# Patient Record
Sex: FEMALE | Race: BLACK OR AFRICAN AMERICAN | NOT HISPANIC OR LATINO
[De-identification: names, ages, dates, MRNs, and addresses within clinical notes are randomized per-mention and may not be internally consistent; named-entity substitution may affect disease eponyms.]

---

## 2020-11-10 ENCOUNTER — APPOINTMENT (OUTPATIENT)
Dept: OBGYN | Facility: CLINIC | Age: 35
End: 2020-11-10
Payer: COMMERCIAL

## 2020-11-10 VITALS
TEMPERATURE: 98.3 F | BODY MASS INDEX: 28.7 KG/M2 | SYSTOLIC BLOOD PRESSURE: 126 MMHG | HEIGHT: 63 IN | WEIGHT: 162 LBS | HEART RATE: 75 BPM | DIASTOLIC BLOOD PRESSURE: 83 MMHG

## 2020-11-10 DIAGNOSIS — Z82.49 FAMILY HISTORY OF ISCHEMIC HEART DISEASE AND OTHER DISEASES OF THE CIRCULATORY SYSTEM: ICD-10-CM

## 2020-11-10 DIAGNOSIS — Z01.419 ENCOUNTER FOR GYNECOLOGICAL EXAMINATION (GENERAL) (ROUTINE) W/OUT ABNORMAL FINDINGS: ICD-10-CM

## 2020-11-10 DIAGNOSIS — Z83.3 FAMILY HISTORY OF DIABETES MELLITUS: ICD-10-CM

## 2020-11-10 PROCEDURE — 99072 ADDL SUPL MATRL&STAF TM PHE: CPT

## 2020-11-10 PROCEDURE — 99213 OFFICE O/P EST LOW 20 MIN: CPT | Mod: 25

## 2020-11-10 PROCEDURE — 99395 PREV VISIT EST AGE 18-39: CPT

## 2020-11-10 NOTE — DISCUSSION/SUMMARY
[FreeTextEntry1] : 36 yo G0 here for annual exam and to schedule surgery for uterine myomas.\par Pt interested in conservative surgical intervention.\par Pt is a Pediatrician.\par \par Plan\par f/u Pap\par MRI\par RTO x 2 wks for results and preop chat.\par Schedule robotic myomectomy - pt understands that we will only address relevant fibroids and that not all fibroids will be removed.\par

## 2020-11-10 NOTE — PHYSICAL EXAM
[Appropriately responsive] : appropriately responsive [Alert] : alert [No Acute Distress] : no acute distress [No Lymphadenopathy] : no lymphadenopathy [Regular Rate Rhythm] : regular rate rhythm [No Murmurs] : no murmurs [Clear to Auscultation B/L] : clear to auscultation bilaterally [Soft] : soft [Non-tender] : non-tender [Non-distended] : non-distended [No HSM] : No HSM [No Lesions] : no lesions [No Mass] : no mass [Oriented x3] : oriented x3 [Examination Of The Breasts] : a normal appearance [No Masses] : no breast masses were palpable [Normal] : normal external genitalia

## 2020-11-10 NOTE — HISTORY OF PRESENT ILLNESS
[FreeTextEntry1] : 36 yo G0 here for annual and eval of ut myomas.\par Pt diagnosed w/fibroids in June.\par Pt noticed fibroids because she noticed constipation then noticed abd mass.\par \par Now s/p USG.\par No urinary frequency.\par Does have significant dysmenorrhea on day 1.\par

## 2020-11-27 ENCOUNTER — NON-APPOINTMENT (OUTPATIENT)
Age: 35
End: 2020-11-27

## 2020-11-27 LAB
CYTOLOGY CVX/VAG DOC THIN PREP: NORMAL
HPV HIGH+LOW RISK DNA PNL CVX: DETECTED

## 2020-12-02 ENCOUNTER — NON-APPOINTMENT (OUTPATIENT)
Age: 35
End: 2020-12-02

## 2020-12-04 ENCOUNTER — APPOINTMENT (OUTPATIENT)
Dept: MRI IMAGING | Facility: CLINIC | Age: 35
End: 2020-12-04

## 2020-12-08 ENCOUNTER — APPOINTMENT (OUTPATIENT)
Dept: OBGYN | Facility: CLINIC | Age: 35
End: 2020-12-08

## 2021-01-02 ENCOUNTER — APPOINTMENT (OUTPATIENT)
Dept: MRI IMAGING | Facility: CLINIC | Age: 36
End: 2021-01-02
Payer: COMMERCIAL

## 2021-01-02 ENCOUNTER — OUTPATIENT (OUTPATIENT)
Dept: OUTPATIENT SERVICES | Facility: HOSPITAL | Age: 36
LOS: 1 days | End: 2021-01-02
Payer: COMMERCIAL

## 2021-01-02 ENCOUNTER — RESULT REVIEW (OUTPATIENT)
Age: 36
End: 2021-01-02

## 2021-01-02 DIAGNOSIS — Z00.8 ENCOUNTER FOR OTHER GENERAL EXAMINATION: ICD-10-CM

## 2021-01-02 PROCEDURE — 72197 MRI PELVIS W/O & W/DYE: CPT | Mod: 26

## 2021-01-02 PROCEDURE — A9585: CPT

## 2021-01-02 PROCEDURE — 72197 MRI PELVIS W/O & W/DYE: CPT

## 2021-01-07 ENCOUNTER — NON-APPOINTMENT (OUTPATIENT)
Age: 36
End: 2021-01-07

## 2021-01-12 ENCOUNTER — APPOINTMENT (OUTPATIENT)
Dept: OBGYN | Facility: CLINIC | Age: 36
End: 2021-01-12
Payer: COMMERCIAL

## 2021-01-12 VITALS
WEIGHT: 162 LBS | BODY MASS INDEX: 28.7 KG/M2 | HEIGHT: 63 IN | HEART RATE: 77 BPM | SYSTOLIC BLOOD PRESSURE: 118 MMHG | DIASTOLIC BLOOD PRESSURE: 70 MMHG

## 2021-01-12 PROCEDURE — 99072 ADDL SUPL MATRL&STAF TM PHE: CPT

## 2021-01-12 PROCEDURE — 99213 OFFICE O/P EST LOW 20 MIN: CPT

## 2021-01-15 ENCOUNTER — OUTPATIENT (OUTPATIENT)
Dept: OUTPATIENT SERVICES | Facility: HOSPITAL | Age: 36
LOS: 1 days | End: 2021-01-15

## 2021-01-15 VITALS
HEART RATE: 71 BPM | DIASTOLIC BLOOD PRESSURE: 74 MMHG | RESPIRATION RATE: 16 BRPM | OXYGEN SATURATION: 98 % | WEIGHT: 158.95 LBS | SYSTOLIC BLOOD PRESSURE: 110 MMHG | TEMPERATURE: 99 F | HEIGHT: 62.75 IN

## 2021-01-15 DIAGNOSIS — D21.9 BENIGN NEOPLASM OF CONNECTIVE AND OTHER SOFT TISSUE, UNSPECIFIED: ICD-10-CM

## 2021-01-15 DIAGNOSIS — D25.9 LEIOMYOMA OF UTERUS, UNSPECIFIED: ICD-10-CM

## 2021-01-15 DIAGNOSIS — Z98.890 OTHER SPECIFIED POSTPROCEDURAL STATES: Chronic | ICD-10-CM

## 2021-01-15 LAB
BLD GP AB SCN SERPL QL: NEGATIVE — SIGNIFICANT CHANGE UP
HCG UR QL: NEGATIVE — SIGNIFICANT CHANGE UP
HCT VFR BLD CALC: 38.8 % — SIGNIFICANT CHANGE UP (ref 34.5–45)
HGB BLD-MCNC: 12.5 G/DL — SIGNIFICANT CHANGE UP (ref 11.5–15.5)
MCHC RBC-ENTMCNC: 29.6 PG — SIGNIFICANT CHANGE UP (ref 27–34)
MCHC RBC-ENTMCNC: 32.2 GM/DL — SIGNIFICANT CHANGE UP (ref 32–36)
MCV RBC AUTO: 91.9 FL — SIGNIFICANT CHANGE UP (ref 80–100)
NRBC # BLD: 0 /100 WBCS — SIGNIFICANT CHANGE UP
NRBC # FLD: 0 K/UL — SIGNIFICANT CHANGE UP
PLATELET # BLD AUTO: 198 K/UL — SIGNIFICANT CHANGE UP (ref 150–400)
RBC # BLD: 4.22 M/UL — SIGNIFICANT CHANGE UP (ref 3.8–5.2)
RBC # FLD: 13.4 % — SIGNIFICANT CHANGE UP (ref 10.3–14.5)
RH IG SCN BLD-IMP: POSITIVE — SIGNIFICANT CHANGE UP
WBC # BLD: 4.2 K/UL — SIGNIFICANT CHANGE UP (ref 3.8–10.5)
WBC # FLD AUTO: 4.2 K/UL — SIGNIFICANT CHANGE UP (ref 3.8–10.5)

## 2021-01-15 NOTE — H&P PST ADULT - HISTORY OF PRESENT ILLNESS
36 yo female with no significant pmh presents to PST unit with pre-op diagnosis of benign neoplasm of connective and other soft tissue scheduled for robotic myomectomy with Dr. Willams. She reports fibroids causing pain and constipation.

## 2021-01-15 NOTE — H&P PST ADULT - NSANTHOSAYNRD_GEN_A_CORE
No. CHICHI screening performed.  STOP BANG Legend: 0-2 = LOW Risk; 3-4 = INTERMEDIATE Risk; 5-8 = HIGH Risk

## 2021-01-15 NOTE — H&P PST ADULT - ASSESSMENT
34 yo female with no significant pmh presents to PST unit with pre-op diagnosis of benign neoplasm of connective and other soft tissue scheduled for robotic myomectomy with Dr. Willams.

## 2021-01-15 NOTE — H&P PST ADULT - NSICDXPROBLEM_GEN_ALL_CORE_FT
PROBLEM DIAGNOSES  Problem: Fibroid uterus  Assessment and Plan:  scheduled for robotic myomectomy with Dr. Willams.   Verbal and written pre-op instructions provided to patient. Patient verbalized understanding and will call surgeons office for revised instructions if surgery is rescheduled.   Pepcid for GI prophylaxis provided.   patient given verbal and written instruction with teach back on chlorhexidine shampoo, and the patient verbalized understanding with return demonstration.   Patient aware of need for COVID testing prior to  procedure and advised to coordinate with surgeon.   Urine pregnancy test DOS-Urine cup provided.

## 2021-01-16 NOTE — DISCUSSION/SUMMARY
[FreeTextEntry1] : 37 yo P0 w/symptomatic ut myomas.\par MRI images reviewed w/pt.\par One dominant myoma w/small IM myomas visualized.\par \par Plan\par Keep appointment for [place] myomectomy.

## 2021-01-19 DIAGNOSIS — Z01.818 ENCOUNTER FOR OTHER PREPROCEDURAL EXAMINATION: ICD-10-CM

## 2021-01-22 ENCOUNTER — APPOINTMENT (OUTPATIENT)
Dept: OBGYN | Facility: CLINIC | Age: 36
End: 2021-01-22

## 2021-01-23 NOTE — ASU PATIENT PROFILE, ADULT - PSH
Received request for 90 day supply Quetiapine XR 400mg    Rx last refilled on 09/20/2019 for 30 day w-5 RF  Refill refused, Message sent to pharmacy asking for call back to clinic if 90 day REQUIRED.       H/O colonoscopy

## 2021-01-24 ENCOUNTER — TRANSCRIPTION ENCOUNTER (OUTPATIENT)
Age: 36
End: 2021-01-24

## 2021-01-25 ENCOUNTER — INPATIENT (INPATIENT)
Facility: HOSPITAL | Age: 36
LOS: 0 days | Discharge: HOME | End: 2021-01-26
Attending: OBSTETRICS & GYNECOLOGY | Admitting: OBSTETRICS & GYNECOLOGY
Payer: COMMERCIAL

## 2021-01-25 ENCOUNTER — RESULT REVIEW (OUTPATIENT)
Age: 36
End: 2021-01-25

## 2021-01-25 ENCOUNTER — APPOINTMENT (OUTPATIENT)
Dept: OBGYN | Facility: HOSPITAL | Age: 36
End: 2021-01-25

## 2021-01-25 VITALS
TEMPERATURE: 98 F | DIASTOLIC BLOOD PRESSURE: 63 MMHG | WEIGHT: 158.95 LBS | SYSTOLIC BLOOD PRESSURE: 117 MMHG | RESPIRATION RATE: 16 BRPM | HEART RATE: 86 BPM | HEIGHT: 72 IN | OXYGEN SATURATION: 100 %

## 2021-01-25 DIAGNOSIS — Z98.890 OTHER SPECIFIED POSTPROCEDURAL STATES: Chronic | ICD-10-CM

## 2021-01-25 DIAGNOSIS — D21.9 BENIGN NEOPLASM OF CONNECTIVE AND OTHER SOFT TISSUE, UNSPECIFIED: ICD-10-CM

## 2021-01-25 LAB
HCG UR QL: NEGATIVE — SIGNIFICANT CHANGE UP
RH IG SCN BLD-IMP: POSITIVE — SIGNIFICANT CHANGE UP
SARS-COV-2 N GENE NPH QL NAA+PROBE: NOT DETECTED

## 2021-01-25 PROCEDURE — 58546 LAPARO-MYOMECTOMY COMPLEX: CPT

## 2021-01-25 PROCEDURE — 88305 TISSUE EXAM BY PATHOLOGIST: CPT | Mod: 26

## 2021-01-25 RX ORDER — OXYCODONE HYDROCHLORIDE 5 MG/1
1 TABLET ORAL
Qty: 5 | Refills: 0
Start: 2021-01-25

## 2021-01-25 RX ORDER — ACETAMINOPHEN 500 MG
2 TABLET ORAL
Qty: 40 | Refills: 0
Start: 2021-01-25 | End: 2021-01-29

## 2021-01-25 RX ORDER — IBUPROFEN 200 MG
1 TABLET ORAL
Qty: 20 | Refills: 0
Start: 2021-01-25 | End: 2021-01-30

## 2021-01-25 RX ORDER — MEPERIDINE HYDROCHLORIDE 50 MG/ML
12.5 INJECTION INTRAMUSCULAR; INTRAVENOUS; SUBCUTANEOUS
Refills: 0 | Status: DISCONTINUED | OUTPATIENT
Start: 2021-01-25 | End: 2021-01-26

## 2021-01-25 RX ORDER — SODIUM CHLORIDE 9 MG/ML
1000 INJECTION, SOLUTION INTRAVENOUS
Refills: 0 | Status: DISCONTINUED | OUTPATIENT
Start: 2021-01-25 | End: 2021-01-26

## 2021-01-25 RX ORDER — HYDROMORPHONE HYDROCHLORIDE 2 MG/ML
0.5 INJECTION INTRAMUSCULAR; INTRAVENOUS; SUBCUTANEOUS
Refills: 0 | Status: DISCONTINUED | OUTPATIENT
Start: 2021-01-25 | End: 2021-01-26

## 2021-01-25 RX ORDER — SENNA PLUS 8.6 MG/1
1 TABLET ORAL
Qty: 20 | Refills: 0
Start: 2021-01-25 | End: 2021-02-04

## 2021-01-25 RX ORDER — SENNA PLUS 8.6 MG/1
1 TABLET ORAL
Qty: 20 | Refills: 0
Start: 2021-01-25 | End: 2021-02-03

## 2021-01-25 RX ORDER — IBUPROFEN 200 MG
1 TABLET ORAL
Qty: 20 | Refills: 0
Start: 2021-01-25 | End: 2021-01-29

## 2021-01-25 RX ORDER — ACETAMINOPHEN 500 MG
2 TABLET ORAL
Qty: 40 | Refills: 0
Start: 2021-01-25 | End: 2021-01-30

## 2021-01-25 RX ORDER — ONDANSETRON 8 MG/1
4 TABLET, FILM COATED ORAL ONCE
Refills: 0 | Status: COMPLETED | OUTPATIENT
Start: 2021-01-25 | End: 2021-01-26

## 2021-01-25 NOTE — ASU PREOP CHECKLIST - TAMPON REMOVED
The patient is requesting a referral for tomorrows surgery. He refused to release more information to me   n/a

## 2021-01-25 NOTE — ASU DISCHARGE PLAN (ADULT/PEDIATRIC) - ASU DC SPECIAL INSTRUCTIONSFT
Discharge Instructions:  1. Diet: advance as tolerated  2. Activity limited by:   - no running, heavy lifting, strenuous exercise,   - nothing in vagina (bath, swim, intercourse, douching, tampons) for 2 weeks  3. Medications:   - Senna to prevent constipation (please hold for loose stools)  - Ibuprofen 600mg every 6 hours as needed for pain  - Acetaminophen 500mg every 4 hours as needed for pain  - Oxycodone 5mg every 4 hours for severe pain   4. Follow-up appointment - 2 weeks. Please call the office upon discharge to schedule your appointment if you do not have one already.   5. Precautions:  - Call the office or go to the ED if you have any of the followin) Fever >100.4 that does not resolve  2) Intractable pain  3) Heavy bleeding Discharge Instructions:  1. Diet: advance as tolerated  2. Activity limited by:   - no running, heavy lifting, strenuous exercise,   - nothing in vagina (bath, swim, intercourse, douching, tampons) for 2 weeks  3. Medications:   - Senna to prevent constipation (please hold for loose stools)  - Naprosyn 220x2 every 12 hours   - Oxycodone 5mg every 4 hours for severe pain   4. Follow-up appointment - 2 weeks. Please call the office upon discharge to schedule your appointment if you do not have one already.   5. Precautions:  - Call the office or go to the ED if you have any of the followin) Fever >100.4 that does not resolve  2) Intractable pain  3) Heavy bleeding

## 2021-01-25 NOTE — ASU DISCHARGE PLAN (ADULT/PEDIATRIC) - CALL YOUR DOCTOR IF YOU HAVE ANY OF THE FOLLOWING:
Bleeding that does not stop/Pain not relieved by Medications/Fever greater than (need to indicate Fahrenheit or Celsius) Bleeding that does not stop/Pain not relieved by Medications/Fever greater than (need to indicate Fahrenheit or Celsius)/Wound/Surgical Site with redness, or foul smelling discharge or pus/Unable to urinate

## 2021-01-26 VITALS — OXYGEN SATURATION: 99 % | HEART RATE: 88 BPM | RESPIRATION RATE: 19 BRPM

## 2021-01-26 DIAGNOSIS — Z98.890 OTHER SPECIFIED POSTPROCEDURAL STATES: ICD-10-CM

## 2021-01-26 LAB
HCT VFR BLD CALC: 35.6 % — SIGNIFICANT CHANGE UP (ref 34.5–45)
HGB BLD-MCNC: 11.7 G/DL — SIGNIFICANT CHANGE UP (ref 11.5–15.5)
MCHC RBC-ENTMCNC: 29.5 PG — SIGNIFICANT CHANGE UP (ref 27–34)
MCHC RBC-ENTMCNC: 32.9 GM/DL — SIGNIFICANT CHANGE UP (ref 32–36)
MCV RBC AUTO: 89.9 FL — SIGNIFICANT CHANGE UP (ref 80–100)
NRBC # BLD: 0 /100 WBCS — SIGNIFICANT CHANGE UP
NRBC # FLD: 0 K/UL — SIGNIFICANT CHANGE UP
PLATELET # BLD AUTO: 221 K/UL — SIGNIFICANT CHANGE UP (ref 150–400)
RBC # BLD: 3.96 M/UL — SIGNIFICANT CHANGE UP (ref 3.8–5.2)
RBC # FLD: 13 % — SIGNIFICANT CHANGE UP (ref 10.3–14.5)
WBC # BLD: 10.04 K/UL — SIGNIFICANT CHANGE UP (ref 3.8–10.5)
WBC # FLD AUTO: 10.04 K/UL — SIGNIFICANT CHANGE UP (ref 3.8–10.5)

## 2021-01-26 RX ORDER — OXYCODONE HYDROCHLORIDE 5 MG/1
1 TABLET ORAL
Qty: 8 | Refills: 0
Start: 2021-01-26 | End: 2021-01-27

## 2021-01-26 RX ORDER — OXYCODONE HYDROCHLORIDE 5 MG/1
5 TABLET ORAL EVERY 6 HOURS
Refills: 0 | Status: DISCONTINUED | OUTPATIENT
Start: 2021-01-26 | End: 2021-01-26

## 2021-01-26 RX ORDER — KETOROLAC TROMETHAMINE 30 MG/ML
30 SYRINGE (ML) INJECTION EVERY 6 HOURS
Refills: 0 | Status: DISCONTINUED | OUTPATIENT
Start: 2021-01-26 | End: 2021-01-26

## 2021-01-26 RX ORDER — ACETAMINOPHEN 500 MG
975 TABLET ORAL EVERY 6 HOURS
Refills: 0 | Status: DISCONTINUED | OUTPATIENT
Start: 2021-01-26 | End: 2021-01-26

## 2021-01-26 RX ORDER — SODIUM CHLORIDE 9 MG/ML
1000 INJECTION, SOLUTION INTRAVENOUS
Refills: 0 | Status: DISCONTINUED | OUTPATIENT
Start: 2021-01-26 | End: 2021-01-26

## 2021-01-26 RX ADMIN — ONDANSETRON 4 MILLIGRAM(S): 8 TABLET, FILM COATED ORAL at 06:15

## 2021-01-26 RX ADMIN — OXYCODONE HYDROCHLORIDE 5 MILLIGRAM(S): 5 TABLET ORAL at 04:19

## 2021-01-26 RX ADMIN — OXYCODONE HYDROCHLORIDE 5 MILLIGRAM(S): 5 TABLET ORAL at 07:45

## 2021-01-26 RX ADMIN — HYDROMORPHONE HYDROCHLORIDE 0.5 MILLIGRAM(S): 2 INJECTION INTRAMUSCULAR; INTRAVENOUS; SUBCUTANEOUS at 01:10

## 2021-01-26 RX ADMIN — Medication 975 MILLIGRAM(S): at 06:09

## 2021-01-26 RX ADMIN — HYDROMORPHONE HYDROCHLORIDE 0.5 MILLIGRAM(S): 2 INJECTION INTRAMUSCULAR; INTRAVENOUS; SUBCUTANEOUS at 02:37

## 2021-01-26 RX ADMIN — SODIUM CHLORIDE 100 MILLILITER(S): 9 INJECTION, SOLUTION INTRAVENOUS at 00:40

## 2021-01-26 RX ADMIN — Medication 30 MILLIGRAM(S): at 06:14

## 2021-01-26 NOTE — BRIEF OPERATIVE NOTE - NSICDXBRIEFPROCEDURE_GEN_ALL_CORE_FT
PROCEDURES:  Myomectomy, uterus, robot-assisted, laparoscopic, 5 or more leiomyomata 26-Jan-2021 00:19:45  [place]

## 2021-01-26 NOTE — CHART NOTE - NSCHARTNOTEFT_GEN_A_CORE
[id number] POST-OP CHECK    S: Patient seen and evaluated at bedside.  Pt awake and alert resting comfortaby in bed.  Patient reports pain controlled with analgesia. Pt denies N/V, SOB, CP, palpitations, fever/chills. Tolerating clears.  Not OOB yet.    O:   T(C): 36.8 (01-26-21 @ 00:20), Max: 36.8 (01-26-21 @ 00:20)  HR: 92 (01-26-21 @ 03:00) (79 - 102)  BP: 116/74 (01-26-21 @ 03:00) (110/59 - 129/74)  RR: 18 (01-26-21 @ 03:00) (15 - 23)  SpO2: 97% (01-26-21 @ 03:00) (94% - 100%)  Wt(kg): --  I&O's Summary    25 Jan 2021 07:01  -  26 Jan 2021 03:24  --------------------------------------------------------  IN: 300 mL / OUT: 0 mL / NET: 300 mL        Gen: Resting comfortably in bed, NAD  Abd: soft, appropriately tender.    Inc: Clean/dry/intact w/ bandage in place  Ext: SCD's in place and functional, non-tender b/l, no edema        A/P: 36y Female s/p RA Myomectomy recovering well in immediate postoperative period.   - CBC pending  - Advance to regular diet  - Toradol, tylenol, oxy PRN for pain  - Pt to be transferred to [facility] PGY2

## 2021-01-26 NOTE — PROGRESS NOTE ADULT - SUBJECTIVE AND OBJECTIVE BOX
GYN ATTENDING/MIGS FELLOW PROGRESS NOTE    I/E: no acute events overnight    S: Patient reports feeling well. Pain well controlled. Ambulating to restroom and voiding freely. Tolerating sips of clears      O:  T(C): 36.8 (01-26-21 @ 00:20), Max: 36.8 (01-26-21 @ 00:20)  HR: 87 (01-26-21 @ 04:00) (79 - 102)  BP: 129/79 (01-26-21 @ 04:00) (110/59 - 129/79)  RR: 16 (01-26-21 @ 04:00) (15 - 23)  SpO2: 98% (01-26-21 @ 04:00) (94% - 100%)      01-25-21 @ 07:01  -  01-26-21 @ 06:08  --------------------------------------------------------  IN: 1120 mL / OUT: 550 mL / NET: 570 mL        Physical Exam:  Gen: AAOx3  Abd: soft, NT, ND, no rebound or guarding  Incision: laparoscopic port sites intact with steris in place;  umbilical incision with tegaderm and gauze intact without erythema or drainage  LE: no c/c/e      LABS                            11.7   10.04 )-----------( 221      ( 26 Jan 2021 03:27 )             35.6         A/P: Ms. Grant is now POD#1 s/p robotic assisted laparoscopic myomectomy. Case notable for EBL 500cc and case end time ~12AM. Patient is recovering well postoperatively and meeting all postoperative milestones. Will continue to monitor with plan for likely discharge home later this morning.   - Intraop findings dw patient   - Postoperative care and management dw patient  - All questions answered    Dr. Willams made aware
PGY1 Post Op    S: Patient seen and evaluated at bedside in PACU.  Pt awake and alert resting comfortaby in bed. Her pain is well controlled. She is tolerating clears. Has gotten out of bed and voided. Denies n/v, SOB, CP, fevers/chills, or any other acute concerns.     O:   T(C): 37.6 (01-26-21 @ 06:00), Max: 37.6 (01-26-21 @ 06:00)  HR: 93 (01-26-21 @ 06:00) (87 - 93)  BP: 120/71 (01-26-21 @ 06:00) (120/71 - 129/79)  RR: 19 (01-26-21 @ 06:00) (16 - 19)  SpO2: 96% (01-26-21 @ 06:00) (96% - 98%)  Wt(kg): --  I&O's Summary    25 Jan 2021 07:01  -  26 Jan 2021 07:00  --------------------------------------------------------  IN: 1120 mL / OUT: 550 mL / NET: 570 mL        Gen: Resting comfortably in bed, NAD  CV: RRR  Lungs: CTA B/L  Abd: 5 port sites c/d/i, soft, appropriately tender  Ext: no edema or tenderness        A/P: 36y Female s/p ......

## 2021-01-26 NOTE — BRIEF OPERATIVE NOTE - OPERATION/FINDINGS
EUA  - bulky 20wk sized uterus, mobile  - normal appearing cervix and vaginal mucosa  - normal external female genitalia  Laparoscopic  - 18 wk uterus bulky, and mobile with large 12cm fundal subserosal fibroid, posterior mid-corpus left 3cm and posterior right mid-corpus 3cm subserosal fibroid, anterior left lower uterine segment bilobed 4cm subserosal fibroid, small right anterior lower uterine segment fibroid 1cm   - normal appearing bilateral ovaries and fallopian tubes   - bilateral ureters seen vermiculating  - normal appearing liver edge and appendix  -  endometrial cavity not entered however due to extensive myometrial dissection, recommend waiting 6months prior to trying to conceive, and mode o f delivery via primary  section

## 2021-01-29 ENCOUNTER — TRANSCRIPTION ENCOUNTER (OUTPATIENT)
Age: 36
End: 2021-01-29

## 2021-01-29 LAB — SURGICAL PATHOLOGY STUDY: SIGNIFICANT CHANGE UP

## 2021-02-06 ENCOUNTER — NON-APPOINTMENT (OUTPATIENT)
Age: 36
End: 2021-02-06

## 2021-02-09 ENCOUNTER — APPOINTMENT (OUTPATIENT)
Dept: OBGYN | Facility: CLINIC | Age: 36
End: 2021-02-09

## 2021-02-09 VITALS
HEART RATE: 80 BPM | SYSTOLIC BLOOD PRESSURE: 123 MMHG | HEIGHT: 63 IN | WEIGHT: 158 LBS | DIASTOLIC BLOOD PRESSURE: 80 MMHG | BODY MASS INDEX: 28 KG/M2 | TEMPERATURE: 97.7 F

## 2021-02-09 DIAGNOSIS — D21.9 BENIGN NEOPLASM OF CONNECTIVE AND OTHER SOFT TISSUE, UNSPECIFIED: ICD-10-CM

## 2021-02-10 ENCOUNTER — TRANSCRIPTION ENCOUNTER (OUTPATIENT)
Age: 36
End: 2021-02-10

## 2021-02-20 PROBLEM — D21.9 FIBROID: Status: ACTIVE | Noted: 2020-11-10

## 2021-02-20 NOTE — HISTORY OF PRESENT ILLNESS
[FreeTextEntry1] : 35 yo P0 here for PO check s/p robotic myomectomy 1/25/21.\par No major complaints.\par

## 2021-02-20 NOTE — DISCUSSION/SUMMARY
[FreeTextEntry1] : 37 yo P0 here for PO check after robotic myomectomy 21.\par Pt recovering well.\par Pathology benign - 500 g.\par \par Plan\par Routine f/u prn\par  for delivery

## 2021-03-09 ENCOUNTER — APPOINTMENT (OUTPATIENT)
Dept: OBGYN | Facility: CLINIC | Age: 36
End: 2021-03-09

## 2021-11-05 PROBLEM — D25.9 LEIOMYOMA OF UTERUS, UNSPECIFIED: Chronic | Status: ACTIVE | Noted: 2021-01-15

## 2022-01-07 ENCOUNTER — APPOINTMENT (OUTPATIENT)
Dept: INTERNAL MEDICINE | Facility: CLINIC | Age: 37
End: 2022-01-07

## 2022-03-04 ENCOUNTER — ASOB RESULT (OUTPATIENT)
Age: 37
End: 2022-03-04

## 2022-03-04 ENCOUNTER — APPOINTMENT (OUTPATIENT)
Dept: ANTEPARTUM | Facility: CLINIC | Age: 37
End: 2022-03-04
Payer: COMMERCIAL

## 2022-03-04 PROCEDURE — 76813 OB US NUCHAL MEAS 1 GEST: CPT

## 2022-03-04 PROCEDURE — 36416 COLLJ CAPILLARY BLOOD SPEC: CPT

## 2022-03-04 PROCEDURE — 76801 OB US < 14 WKS SINGLE FETUS: CPT

## 2022-03-18 ENCOUNTER — EMERGENCY (EMERGENCY)
Facility: HOSPITAL | Age: 37
LOS: 1 days | Discharge: HOME | End: 2022-03-18
Attending: EMERGENCY MEDICINE | Admitting: EMERGENCY MEDICINE
Payer: COMMERCIAL

## 2022-03-18 VITALS
TEMPERATURE: 98 F | RESPIRATION RATE: 18 BRPM | HEIGHT: 72 IN | DIASTOLIC BLOOD PRESSURE: 65 MMHG | SYSTOLIC BLOOD PRESSURE: 125 MMHG | HEART RATE: 110 BPM | OXYGEN SATURATION: 100 %

## 2022-03-18 VITALS
SYSTOLIC BLOOD PRESSURE: 110 MMHG | HEART RATE: 77 BPM | DIASTOLIC BLOOD PRESSURE: 64 MMHG | RESPIRATION RATE: 16 BRPM | OXYGEN SATURATION: 100 % | TEMPERATURE: 98 F

## 2022-03-18 DIAGNOSIS — Z98.890 OTHER SPECIFIED POSTPROCEDURAL STATES: Chronic | ICD-10-CM

## 2022-03-18 LAB
APPEARANCE UR: CLEAR — SIGNIFICANT CHANGE UP
BACTERIA # UR AUTO: ABNORMAL
BILIRUB UR-MCNC: NEGATIVE — SIGNIFICANT CHANGE UP
BLD GP AB SCN SERPL QL: NEGATIVE — SIGNIFICANT CHANGE UP
COLOR SPEC: YELLOW — SIGNIFICANT CHANGE UP
DIFF PNL FLD: ABNORMAL
EPI CELLS # UR: 3 /HPF — SIGNIFICANT CHANGE UP (ref 0–5)
GLUCOSE UR QL: NEGATIVE — SIGNIFICANT CHANGE UP
HYALINE CASTS # UR AUTO: 6 /LPF — SIGNIFICANT CHANGE UP (ref 0–7)
KETONES UR-MCNC: NEGATIVE — SIGNIFICANT CHANGE UP
LEUKOCYTE ESTERASE UR-ACNC: ABNORMAL
NITRITE UR-MCNC: NEGATIVE — SIGNIFICANT CHANGE UP
PH UR: 5.5 — SIGNIFICANT CHANGE UP (ref 5–8)
PROT UR-MCNC: ABNORMAL
RBC CASTS # UR COMP ASSIST: 2 /HPF — SIGNIFICANT CHANGE UP (ref 0–4)
RH IG SCN BLD-IMP: POSITIVE — SIGNIFICANT CHANGE UP
SP GR SPEC: 1.02 — SIGNIFICANT CHANGE UP (ref 1–1.05)
UROBILINOGEN FLD QL: SIGNIFICANT CHANGE UP
WBC UR QL: 12 /HPF — HIGH (ref 0–5)

## 2022-03-18 PROCEDURE — 99284 EMERGENCY DEPT VISIT MOD MDM: CPT

## 2022-03-18 PROCEDURE — 76805 OB US >/= 14 WKS SNGL FETUS: CPT | Mod: 26

## 2022-03-18 NOTE — ED ADULT TRIAGE NOTE - CHIEF COMPLAINT QUOTE
Pt presents to ED ambulatory from home with c/o vaginal bleeding and abdominal cramping since this morning. Pt reports she is 14 weeks pregnant , URIAH , Dr. Crawford is OBGYN. L&D called advised pt to be seen in ER.

## 2022-03-18 NOTE — ED PROVIDER NOTE - NSFOLLOWUPINSTRUCTIONS_ED_ALL_ED_FT
Please follow with your OB/GYN within the next 3 days. Follow-up with PCP in 2-3 days for reassessment.    Take home medication as prescribed. You may take Tylenol 500mg every 6 hours for pain. Avoid NSAIDs.     Return to ED for any new or worsening symptoms including but not limited to: development of chest pain, shortness of breath, fever, vomiting, focal numbness, weakness or tingling, any severe CP, headache, abdominal pain, back pain.  

## 2022-03-18 NOTE — ED PROVIDER NOTE - PROGRESS NOTE DETAILS
Joanna West MD (PGY1): Fetal . R ovary ~5cm on transabdominal US. Will get TVUS for further evaluation. Joanna West MD (PGY1): TVUS w/ 4.2cm cyst. No clinical sx of torsion. UA pending.

## 2022-03-18 NOTE — ED PROVIDER NOTE - ATTENDING CONTRIBUTION TO CARE
Attending note:   After face to face evaluation of this patient, I concur with above noted hx, pe, and care plan for this patient.  Gómez: 37 yof with 14 week gestation with first pregnant complaining of lower abdominal pain on both sides for one day. Pt noted some brown discharge. No nausea, no vomiting, no diarrhea, no trauma, no urinary sxs. Pt is well appearing, no distress, clear lungs, normal cardiac, abd soft, gravid with minimal tn in rlq, no rebound, no guarding, no edema.  POCUS shows cyst in right ovary, will get TVUS and labs and UA.

## 2022-03-18 NOTE — ED PROVIDER NOTE - OBJECTIVE STATEMENT
Patient is a 36 y/o F 14 wks gestation, LMP 2021,  p/w b/l pelvic pain worse at RLQ that began today, non-radiating, constant. No associated f/c, n/v, urinary or bowel symptoms. Noticed small amount of dark brown discharge today when wiping but later noticed a small toilet paper w/ pink discharge. No obvious blood. Noticed more "egg white" discharge two days but no malodorous or thick discharge., Followed w/ obgyn Dr. Gilbert 2 wks ago. Hx fibroids.

## 2022-03-18 NOTE — ED PROVIDER NOTE - PHYSICAL EXAMINATION
General: Alert and Orientated x 3. No apparent distress.  Head: Normocephalic and atraumatic.  Eyes: PERRLA with EOMI.  Neck: Supple. Trachea midline.   Cardiac: Normal S1 and S2 w/ RRR. No murmurs appreciated.   Pulmonary: CTA bilaterally. No increased WOB. No wheezes or crackles.  Abdominal: Soft, gravid abdomen, mild ttp at RLQ. (+) bowel sounds appreciated in all 4 quadrants. No hepatosplenomegaly. no cva tenderness.   Neurologic: No focal sensory or motor deficits.  Musculoskeletal: Strength appropriate in all 4 extremities for age with no limited ROM.  Skin: Color appropriate for race. Intact, warm, and well-perfused.  Psychiatric: Appropriate mood and affect. No apparent risk to self or others.

## 2022-03-18 NOTE — ED PROVIDER NOTE - PATIENT PORTAL LINK FT
You can access the FollowMyHealth Patient Portal offered by [facility] by registering at the following website: [email or web address]. By joining [email or web address]’s FollowMyHealth portal, you will also be able to view your health information using other applications (apps) compatible with our system.

## 2022-03-18 NOTE — ED PROVIDER NOTE - CLINICAL SUMMARY MEDICAL DECISION MAKING FREE TEXT BOX
36 y/o F 14 wks w/ some dishcrage and rlq pain today. Normal pregnancy course otherwise. Vitals stable. Exam w/ RLQ pain. Will get Transabdominal US to eval for fetal HR and status. Will get type and screen and UA/cx. Dispo -pending labs and us.

## 2022-03-18 NOTE — ED ADULT TRIAGE NOTE - PAIN RATING/NUMBER SCALE (0-10): ACTIVITY
6
Other (Free Text): Pt will do K 10 next visit
Detail Level: Zone
Note Text (......Xxx Chief Complaint.): This diagnosis correlates with the

## 2022-03-19 PROCEDURE — 76815 OB US LIMITED FETUS(S): CPT | Mod: 26

## 2022-03-20 LAB
CULTURE RESULTS: SIGNIFICANT CHANGE UP
SPECIMEN SOURCE: SIGNIFICANT CHANGE UP

## 2022-05-11 ENCOUNTER — ASOB RESULT (OUTPATIENT)
Age: 37
End: 2022-05-11

## 2022-05-11 ENCOUNTER — APPOINTMENT (OUTPATIENT)
Dept: ANTEPARTUM | Facility: CLINIC | Age: 37
End: 2022-05-11
Payer: COMMERCIAL

## 2022-05-11 PROCEDURE — 76811 OB US DETAILED SNGL FETUS: CPT

## 2022-08-01 ENCOUNTER — RESULT REVIEW (OUTPATIENT)
Age: 37
End: 2022-08-01

## 2022-08-01 ENCOUNTER — OUTPATIENT (OUTPATIENT)
Dept: INPATIENT UNIT | Facility: HOSPITAL | Age: 37
LOS: 1 days | Discharge: HOME | End: 2022-08-01

## 2022-08-01 VITALS — OXYGEN SATURATION: 97 % | HEART RATE: 78 BPM

## 2022-08-01 VITALS — OXYGEN SATURATION: 98 % | HEART RATE: 94 BPM

## 2022-08-01 DIAGNOSIS — Z98.890 OTHER SPECIFIED POSTPROCEDURAL STATES: Chronic | ICD-10-CM

## 2022-08-01 DIAGNOSIS — Z3A.00 WEEKS OF GESTATION OF PREGNANCY NOT SPECIFIED: ICD-10-CM

## 2022-08-01 DIAGNOSIS — O26.899 OTHER SPECIFIED PREGNANCY RELATED CONDITIONS, UNSPECIFIED TRIMESTER: ICD-10-CM

## 2022-08-01 LAB
ALBUMIN SERPL ELPH-MCNC: 3.3 G/DL — SIGNIFICANT CHANGE UP (ref 3.3–5)
ALP SERPL-CCNC: 132 U/L — HIGH (ref 40–120)
ALT FLD-CCNC: 19 U/L — SIGNIFICANT CHANGE UP (ref 4–33)
AMYLASE P1 CFR SERPL: 83 U/L — SIGNIFICANT CHANGE UP (ref 25–125)
ANION GAP SERPL CALC-SCNC: 13 MMOL/L — SIGNIFICANT CHANGE UP (ref 7–14)
AST SERPL-CCNC: 19 U/L — SIGNIFICANT CHANGE UP (ref 4–32)
BILIRUB SERPL-MCNC: <0.2 MG/DL — SIGNIFICANT CHANGE UP (ref 0.2–1.2)
BUN SERPL-MCNC: 7 MG/DL — SIGNIFICANT CHANGE UP (ref 7–23)
CALCIUM SERPL-MCNC: 9.4 MG/DL — SIGNIFICANT CHANGE UP (ref 8.4–10.5)
CHLORIDE SERPL-SCNC: 102 MMOL/L — SIGNIFICANT CHANGE UP (ref 98–107)
CO2 SERPL-SCNC: 21 MMOL/L — LOW (ref 22–31)
CREAT SERPL-MCNC: 0.56 MG/DL — SIGNIFICANT CHANGE UP (ref 0.5–1.3)
EGFR: 121 ML/MIN/1.73M2 — SIGNIFICANT CHANGE UP
GLUCOSE SERPL-MCNC: 108 MG/DL — HIGH (ref 70–99)
HCT VFR BLD CALC: 32.8 % — LOW (ref 34.5–45)
HGB BLD-MCNC: 11.2 G/DL — LOW (ref 11.5–15.5)
LIDOCAIN IGE QN: 25 U/L — SIGNIFICANT CHANGE UP (ref 7–60)
MCHC RBC-ENTMCNC: 29.9 PG — SIGNIFICANT CHANGE UP (ref 27–34)
MCHC RBC-ENTMCNC: 34.1 GM/DL — SIGNIFICANT CHANGE UP (ref 32–36)
MCV RBC AUTO: 87.7 FL — SIGNIFICANT CHANGE UP (ref 80–100)
NRBC # BLD: 0 /100 WBCS — SIGNIFICANT CHANGE UP
NRBC # FLD: 0.06 K/UL — HIGH
PLATELET # BLD AUTO: 139 K/UL — LOW (ref 150–400)
POTASSIUM SERPL-MCNC: 4 MMOL/L — SIGNIFICANT CHANGE UP (ref 3.5–5.3)
POTASSIUM SERPL-SCNC: 4 MMOL/L — SIGNIFICANT CHANGE UP (ref 3.5–5.3)
PROT SERPL-MCNC: 6.6 G/DL — SIGNIFICANT CHANGE UP (ref 6–8.3)
RBC # BLD: 3.74 M/UL — LOW (ref 3.8–5.2)
RBC # FLD: 14.4 % — SIGNIFICANT CHANGE UP (ref 10.3–14.5)
SODIUM SERPL-SCNC: 136 MMOL/L — SIGNIFICANT CHANGE UP (ref 135–145)
WBC # BLD: 7.3 K/UL — SIGNIFICANT CHANGE UP (ref 3.8–10.5)
WBC # FLD AUTO: 7.3 K/UL — SIGNIFICANT CHANGE UP (ref 3.8–10.5)

## 2022-08-01 PROCEDURE — 76705 ECHO EXAM OF ABDOMEN: CPT | Mod: 26

## 2022-08-01 PROCEDURE — 93010 ELECTROCARDIOGRAM REPORT: CPT

## 2022-08-01 RX ORDER — CALCIUM CARBONATE 500(1250)
1 TABLET ORAL ONCE
Refills: 0 | Status: COMPLETED | OUTPATIENT
Start: 2022-08-01 | End: 2022-08-01

## 2022-08-01 RX ORDER — FAMOTIDINE 10 MG/ML
20 INJECTION INTRAVENOUS ONCE
Refills: 0 | Status: COMPLETED | OUTPATIENT
Start: 2022-08-01 | End: 2022-08-01

## 2022-08-01 RX ADMIN — FAMOTIDINE 20 MILLIGRAM(S): 10 INJECTION INTRAVENOUS at 17:24

## 2022-08-01 RX ADMIN — Medication 1 TABLET(S): at 18:09

## 2022-08-01 NOTE — OB PROVIDER TRIAGE NOTE - HISTORY OF PRESENT ILLNESS
ADRIANNA CLAY is a 37y  @ 33w5d GA who presents with a chief complaint of sudden onset severe epigastric pain that began following consumption of pizza around 2pm. She also admits to eating fatty/greasy food for dinner yesterday evening. She describes the pain as intermittent and sharp and stabbing in character, denies any similar episodes in the past, and has not had any SOB, LOC, or episodes of nausea or vomiting. Pt reports (+) fetal movement, denies any contractions, vaginal bleeding or leaking of fluid.     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  Meds: pNV  All: denies                                           REVIEW OF SYSTEMS:  CONSTITUTIONAL: No weakness, fevers or chills  EYES/ENT: No visual changes  RESPIRATORY: No cough, No shortness of breath  CARDIOVASCULAR: No chest pain or palpitations  GASTROINTESTINAL: see HPI  GENITOURINARY: No dysuria, frequency or hematuria  NEUROLOGICAL: No headache, LOC  All other review of systems is negative unless indicated above      Vital Signs Last 24 Hrs  T(C): 37.2 (01 Aug 2022 17:04), Max: 37.2 (01 Aug 2022 16:54)  T(F): 98.96 (01 Aug 2022 17:04), Max: 99 (01 Aug 2022 16:54)  HR: 92 (01 Aug 2022 17:33) (86 - 111)  BP: 119/75 (01 Aug 2022 17:32) (113/69 - 129/75)  RR: 16 (01 Aug 2022 16:54) (16 - 16)  SpO2: 96% (01 Aug 2022 17:33) (93% - 99%)    Parameters below as of 01 Aug 2022 16:54  Patient On (Oxygen Delivery Method): room air      PHYSICAL EXAM:  Constitutional: NAD, awake and alert, well-developed  Respiratory: Breathing comfortably on RA  Cardiovascular: S1 and S2, regular rate and rhythm, no Murmurs, gallops or rubs  Gastrointestinal: gravid, soft, minimal RUQ pain, (+) epigastric tenderness, no fundal tenderness, no guarding, no rebound  Back: no CVA tenderness bilaterally   Genitourinary: (exam not indicated)     Extremities: No peripheral edema, BLE nontender   Neurological: A/O x 3, no focal deficits    FHT: 140/mod/+accels/-decels   [place]: no ctx     TA/TVUS: vtx, anterior palcenta, YAMILETH 12, BPP 8/8      LABS:  (CBC, CMP, Amylase, Lipase pending)        RADIOLOGY & ADDITIONAL STUDIES:  (Abdominal US pending) ADRIANNA CLAY is a 37y  @ 33w5d GA who presents with a chief complaint of sudden onset severe epigastric pain that began following consumption of pizza around 2pm. She also admits to eating fatty/greasy food for dinner yesterday evening. She describes the pain as intermittent and sharp and stabbing in character, denies any similar episodes in the past, and has not had any SOB, LOC, or episodes of nausea or vomiting. Pt reports (+) fetal movement, denies any contractions, vaginal bleeding or leaking of fluid.     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  Meds: pNV  All: denies                                           REVIEW OF SYSTEMS:  CONSTITUTIONAL: No weakness, fevers or chills  EYES/ENT: No visual changes  RESPIRATORY: No cough, No shortness of breath  CARDIOVASCULAR: No chest pain or palpitations  GASTROINTESTINAL: see HPI  GENITOURINARY: No dysuria, frequency or hematuria  NEUROLOGICAL: No headache, LOC  All other review of systems is negative unless indicated above      Vital Signs Last 24 Hrs  T(C): 37.2 (01 Aug 2022 17:04), Max: 37.2 (01 Aug 2022 16:54)  T(F): 98.96 (01 Aug 2022 17:04), Max: 99 (01 Aug 2022 16:54)  HR: 92 (01 Aug 2022 17:33) (86 - 111)  BP: 119/75 (01 Aug 2022 17:32) (113/69 - 129/75)  RR: 16 (01 Aug 2022 16:54) (16 - 16)  SpO2: 96% (01 Aug 2022 17:33) (93% - 99%)    Parameters below as of 01 Aug 2022 16:54  Patient On (Oxygen Delivery Method): room air      PHYSICAL EXAM:  Constitutional: NAD, awake and alert, well-developed  Respiratory: Breathing comfortably on RA  Cardiovascular: S1 and S2, regular rate and rhythm, no Murmurs, gallops or rubs  Gastrointestinal: gravid, soft, minimal RUQ pain, (+) epigastric tenderness, no fundal tenderness, no guarding, no rebound  Back: no CVA tenderness bilaterally   Genitourinary: (exam not indicated)     Extremities: No peripheral edema, BLE nontender   Neurological: A/O x 3, no focal deficits    FHT: 140/mod/+accels/-decels   [place]: no ctx     TA/TVUS: vtx, anterior palcenta, YAMILETH 12, BPP 8/8      LABS:  (CBC, CMP, Amylase, Lipase pending)        RADIOLOGY & ADDITIONAL STUDIES:  (Abdominal US pending) ADRIANNA CLAY is a 37y  @ 33w5d GA who presents with a chief complaint of sudden onset severe chest pain that began following consumption of pizza around 2pm. She also admits to eating fatty/greasy food for dinner yesterday evening. When asked where the pain occurs patient points to the epigastric region under the xiphoid. She describes the pain as intermittent and sharp and stabbing in character, denies any similar episodes in the past, and has not had any SOB, LOC, or episodes of nausea or vomiting. Pt reports (+) fetal movement, denies any contractions, vaginal bleeding or leaking of fluid.     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  Meds: pNV  All: denies                                           REVIEW OF SYSTEMS:  CONSTITUTIONAL: No weakness, fevers or chills  EYES/ENT: No visual changes  RESPIRATORY: No cough, No shortness of breath  CARDIOVASCULAR: (+) chest pain (see HPI), pt denies any palpitations  GASTROINTESTINAL: see HPI  GENITOURINARY: No dysuria, frequency or hematuria  NEUROLOGICAL: No headache, LOC  All other review of systems is negative unless indicated above      Vital Signs Last 24 Hrs  T(C): 37.2 (01 Aug 2022 17:04), Max: 37.2 (01 Aug 2022 16:54)  T(F): 98.96 (01 Aug 2022 17:04), Max: 99 (01 Aug 2022 16:54)  HR: 92 (01 Aug 2022 17:33) (86 - 111)  BP: 119/75 (01 Aug 2022 17:32) (113/69 - 129/75)  RR: 16 (01 Aug 2022 16:54) (16 - 16)  SpO2: 96% (01 Aug 2022 17:33) (93% - 99%)    Parameters below as of 01 Aug 2022 16:54  Patient On (Oxygen Delivery Method): room air      PHYSICAL EXAM:  Constitutional: NAD, awake and alert, well-developed  Respiratory: Breathing comfortably on RA  Cardiovascular: S1 and S2, regular rate and rhythm, no Murmurs, gallops or rubs  Gastrointestinal: gravid, soft, minimal RUQ pain, (+) epigastric tenderness, no fundal tenderness, no guarding, no rebound  Back: no CVA tenderness bilaterally   Genitourinary: (exam not indicated)     Extremities: No peripheral edema, BLE nontender   Neurological: A/O x 3, no focal deficits    FHT: 140/mod/+accels/-decels   [place]: no ctx     TA/TVUS: vtx, anterior palcenta, YAMILETH 12, BPP 8/8      LABS:  (CBC, CMP, Amylase, Lipase pending)        RADIOLOGY & ADDITIONAL STUDIES:  (Abdominal US pending)

## 2022-08-01 NOTE — OB PROVIDER TRIAGE NOTE - NSOBPROVIDERNOTE_OBGYN_ALL_OB_FT
ADRIANNA CLAY is a 37y  @ 33w5d GA who presents with a chief complaint of sudden onset severe epigastric pain that began following consumption of pizza around 2pm, likely biliary colic. She denies any SOB/CP, n/v, and vital signs have remained wnl since presentation.     Plan:   #Epigastric pain  - likely biliary colic vs cholecystitis   - EKG performed, unremarkable   - CBC, CMP, Amylase, Lipase pending   - Abdominal US pending   - TUMS, Pepcid, & Tylenol prn for symptomatic management     #Fetal status   - reassuring: NST reactive, BPP 8/8  - no concern for labor, no ctx on toco & ROS unremarkable     Patient status and plan of care discussed with Dr. Cheko Horton MD  OBGYN PGY4

## 2022-08-12 ENCOUNTER — OUTPATIENT (OUTPATIENT)
Dept: OUTPATIENT SERVICES | Facility: HOSPITAL | Age: 37
LOS: 1 days | End: 2022-08-12

## 2022-08-12 VITALS
OXYGEN SATURATION: 99 % | HEIGHT: 62 IN | DIASTOLIC BLOOD PRESSURE: 80 MMHG | RESPIRATION RATE: 14 BRPM | TEMPERATURE: 97 F | SYSTOLIC BLOOD PRESSURE: 120 MMHG | HEART RATE: 100 BPM | WEIGHT: 195.11 LBS

## 2022-08-12 DIAGNOSIS — Z98.890 OTHER SPECIFIED POSTPROCEDURAL STATES: ICD-10-CM

## 2022-08-12 DIAGNOSIS — Z98.890 OTHER SPECIFIED POSTPROCEDURAL STATES: Chronic | ICD-10-CM

## 2022-08-12 DIAGNOSIS — O34.29 MATERNAL CARE DUE TO UTERINE SCAR FROM OTHER PREVIOUS SURGERY: ICD-10-CM

## 2022-08-12 DIAGNOSIS — Z34.90 ENCOUNTER FOR SUPERVISION OF NORMAL PREGNANCY, UNSPECIFIED, UNSPECIFIED TRIMESTER: ICD-10-CM

## 2022-08-12 LAB
APPEARANCE UR: CLEAR — SIGNIFICANT CHANGE UP
BACTERIA # UR AUTO: NEGATIVE — SIGNIFICANT CHANGE UP
BILIRUB UR-MCNC: NEGATIVE — SIGNIFICANT CHANGE UP
BLD GP AB SCN SERPL QL: NEGATIVE — SIGNIFICANT CHANGE UP
COLOR SPEC: YELLOW — SIGNIFICANT CHANGE UP
DIFF PNL FLD: NEGATIVE — SIGNIFICANT CHANGE UP
EPI CELLS # UR: 4 /HPF — SIGNIFICANT CHANGE UP (ref 0–5)
GLUCOSE UR QL: ABNORMAL
HCT VFR BLD CALC: 33 % — LOW (ref 34.5–45)
HGB BLD-MCNC: 10.7 G/DL — LOW (ref 11.5–15.5)
HYALINE CASTS # UR AUTO: 2 /LPF — SIGNIFICANT CHANGE UP (ref 0–7)
KETONES UR-MCNC: ABNORMAL
LEUKOCYTE ESTERASE UR-ACNC: NEGATIVE — SIGNIFICANT CHANGE UP
MCHC RBC-ENTMCNC: 29.1 PG — SIGNIFICANT CHANGE UP (ref 27–34)
MCHC RBC-ENTMCNC: 32.4 GM/DL — SIGNIFICANT CHANGE UP (ref 32–36)
MCV RBC AUTO: 89.7 FL — SIGNIFICANT CHANGE UP (ref 80–100)
NITRITE UR-MCNC: NEGATIVE — SIGNIFICANT CHANGE UP
NRBC # BLD: 1 /100 WBCS — SIGNIFICANT CHANGE UP
NRBC # FLD: 0.09 K/UL — HIGH
PH UR: 6 — SIGNIFICANT CHANGE UP (ref 5–8)
PLATELET # BLD AUTO: 124 K/UL — LOW (ref 150–400)
PROT UR-MCNC: ABNORMAL
RBC # BLD: 3.68 M/UL — LOW (ref 3.8–5.2)
RBC # FLD: 14.8 % — HIGH (ref 10.3–14.5)
RBC CASTS # UR COMP ASSIST: 8 /HPF — HIGH (ref 0–4)
RH IG SCN BLD-IMP: POSITIVE — SIGNIFICANT CHANGE UP
SP GR SPEC: 1.04 — SIGNIFICANT CHANGE UP (ref 1.01–1.05)
UROBILINOGEN FLD QL: SIGNIFICANT CHANGE UP
WBC # BLD: 7.37 K/UL — SIGNIFICANT CHANGE UP (ref 3.8–10.5)
WBC # FLD AUTO: 7.37 K/UL — SIGNIFICANT CHANGE UP (ref 3.8–10.5)
WBC UR QL: 3 /HPF — SIGNIFICANT CHANGE UP (ref 0–5)

## 2022-08-12 RX ORDER — SODIUM CHLORIDE 9 MG/ML
1000 INJECTION, SOLUTION INTRAVENOUS
Refills: 0 | Status: DISCONTINUED | OUTPATIENT
Start: 2022-08-24 | End: 2022-08-27

## 2022-08-12 NOTE — OB PST NOTE - NSICDXPASTMEDICALHX_GEN_ALL_CORE_FT
PAST MEDICAL HISTORY:  Fibroid uterus     History of myomectomy 2020     PAST MEDICAL HISTORY:  Fibroid uterus     Pregnancy

## 2022-08-12 NOTE — OB PST NOTE - NSHPPHYSICALEXAM_GEN_ALL_CORE
Constitutional: Well Developed, Well Groomed, Well Nourished, No Distress    Eyes: PERRL, EOMI, conjunctiva clear    Ears: Normal    Mouth & Gums: Normal, moist    Pharynx: No tenderness, discharge, or peritonsillar abscess    Tonsils: No Redness, discharge, tenderness, or swelling    Neck: Supple, no JVD, normal thyroid glands, no carotid bruits, no cervical vertebral or paraspinal tenderness    Breast: Normal shape, no masses, no tenderness, nipples normal, no nipple discharge    Back: Normal shape, ROM intact, strength intact, no vertebral tenderness    Respiratory: Airway patent, breath sounds equal, good air movement, respiration non-labored, clear to auscultation bilateral, no chest wall tenderness, no intercostal retractions, no rales, no wheezes, no rhonchi, no subcutaneous emphysema    Cardiovascular:  Regular rate and rhythm, no rubs or murmur, normal PMI    Gastrointestinal: + pregnancy Soft, non-tender, , bowel sound normal, no bruit, no rebound tenderness    Extremities: No clubbing, cyanosis, or pedal edema    Vascular:  Carotid Pulse normal , Radial Pulse normal, Femoral Pulse normal, DP pulse normal, PT pulse normal    Neurological: alert & oriented x 3, sensation intact, deep reflexes intact, cranial nerve intact, normal strength    Skin: warm and dry, normal color    Lymph Nodes: normal posterior cervical lymph node, normal anterior cervical lymph node, normal supraclavicular lymph node, normal axillary lymph node, normal inguinal lymph node, normal femoral lymph node    Musculoskeletal: ROM intact, no joint swelling, warmth, or calf tenderness. Normal strength    Psychiatric: normal affect, normal behavior Constitutional: Well Developed, Well Groomed, Well Nourished, No Distress    Eyes: PERRL, EOMI, conjunctiva clear    Ears: Normal    Mouth & Gums: Normal, moist    Pharynx: No tenderness, discharge, or peritonsillar abscess    Tonsils: No Redness, discharge, tenderness, or swelling    Neck: Supple, no JVD, normal thyroid glands, no carotid bruits, no cervical vertebral or paraspinal tenderness    Breast: Normal shape, no masses, no tenderness, nipples normal, no nipple discharge    Back: Normal shape, ROM intact, strength intact, no vertebral tenderness    Respiratory: Airway patent, breath sounds equal, good air movement, respiration non-labored, clear to auscultation bilateral, no chest wall tenderness, no intercostal retractions, no rales, no wheezes, no rhonchi, no subcutaneous emphysema    Cardiovascular:  positive murmur 1/6 Regular rate and rhythm, no rubs, normal PMI    Gastrointestinal: + pregnancy Soft, non-tender, , bowel sound normal, no bruit, no rebound tenderness    Extremities: No clubbing, cyanosis, or pedal edema    Vascular:  Carotid Pulse normal , Radial Pulse normal, Femoral Pulse normal, DP pulse normal, PT pulse normal    Neurological: alert & oriented x 3, sensation intact, deep reflexes intact, cranial nerve intact, normal strength    Skin: warm and dry, normal color    Lymph Nodes: normal posterior cervical lymph node, normal anterior cervical lymph node, normal supraclavicular lymph node, normal axillary lymph node, normal inguinal lymph node, normal femoral lymph node    Musculoskeletal: ROM intact, no joint swelling, warmth, or calf tenderness. Normal strength    Psychiatric: normal affect, normal behavior

## 2022-08-12 NOTE — OB PST NOTE - NSICDXPASTSURGICALHX_GEN_ALL_CORE_FT
PAST SURGICAL HISTORY:  H/O colonoscopy      PAST SURGICAL HISTORY:  H/O colonoscopy     S/P myomectomy 2021

## 2022-08-12 NOTE — OB PST NOTE - NSHPREVIEWOFSYSTEMS_GEN_ALL_CORE
General: No fever, chills, sweating, anorexia, weight loss or weight gain. No polyphagia, polyurea, polydypsia, malaise, or fatigue    Skin: No rashes, itching, or dryness. No change in size/color of moles. No tumors, brittle nails, pitted nails, or hair loss    Breast: No tenderness, lumps, or nipple discharge      Ophthalmologic: No diplopia, photophobia, lacrimation, blurred Vision , or eye discharge    ENMT Symptoms: No hearing difficulty, ear pain, tinnitus, or vertigo. No sinus symptoms, nasal congestion, nasal   discharge, or nasal obstruction    Respiratory and Thorax: No wheezing, dyspnea, cough, hemoptysis, or pleuritic chest pPain     Cardiovascular: No chest pain, palpitations, dyspnea on exertion, orthopnea, paroxysmal nocturnal dyspnea,   peripheral edema, or claudication    Gastrointestinal: No nausea, vomiting, diarrhea, constipation, change in bowel habits, flatulence, abdominal pain, or melena    Genitourinary/ Pelvis: + pregnancy, reports good fetal movement No hematuria, renal colic, or flank pain.  No urine discoloration, incontinence, dysuria, or urinary hesitancy. Normal urinary frequency. No nocturia, abnormal vaginal bleeding, vaginal discharge, spotting, pelvic pain, or vaginal leakage    Musculoskeletal: No arthralgia, arthritis, joint swelling, muscle cramping, muscle weakness, neck pain, arm pain, or leg pain    Neurological: No transient paralysis, weakness, paresthesias, or seizures. No syncope, tremors, vertigo, loss of sensation, difficulty walking, loss of consciousness, hemiparesis, confusion, or facial palsy    Psychiatric: No suicidal ideation, depression, anxiety, insomnia, memory loss, paranoia, mood swings, agitation, hallucinations, or hyperactivity    Hematology: No gum bleeding, nose bleeding, or skin lumps    Lymphatic: No enlarged or tender lymph nodes. No extremity swelling    Endocrine: No heat or cold intolerance    Immunologic: No recurrent or persistent infections

## 2022-08-12 NOTE — OB PST NOTE - PROBLEM SELECTOR PLAN 1
Pt scheduled for primary  section on 2022.  labs done results pending, Hibiclens provided-  written and verbal instructions given, with teach back, pt able to verbalize understanding.  Pt instructed to obtain preop covid testing.  Preop teaching done, pt able to verbalize understanding.

## 2022-08-12 NOTE — OB PST NOTE - HISTORY OF PRESENT ILLNESS
36y/o female scheduled for primary  section on 2022.  As per scheduled  worksheet having c- section due to hx of myomectomy.   Reports good fetal movement.

## 2022-08-13 ENCOUNTER — OUTPATIENT (OUTPATIENT)
Dept: INPATIENT UNIT | Facility: HOSPITAL | Age: 37
LOS: 1 days | Discharge: HOME | End: 2022-08-13

## 2022-08-13 VITALS
RESPIRATION RATE: 16 BRPM | TEMPERATURE: 98 F | DIASTOLIC BLOOD PRESSURE: 65 MMHG | SYSTOLIC BLOOD PRESSURE: 126 MMHG | HEART RATE: 80 BPM

## 2022-08-13 VITALS — HEART RATE: 94 BPM | DIASTOLIC BLOOD PRESSURE: 68 MMHG | SYSTOLIC BLOOD PRESSURE: 117 MMHG

## 2022-08-13 DIAGNOSIS — Z98.890 OTHER SPECIFIED POSTPROCEDURAL STATES: Chronic | ICD-10-CM

## 2022-08-13 DIAGNOSIS — O26.899 OTHER SPECIFIED PREGNANCY RELATED CONDITIONS, UNSPECIFIED TRIMESTER: ICD-10-CM

## 2022-08-13 DIAGNOSIS — Z3A.00 WEEKS OF GESTATION OF PREGNANCY NOT SPECIFIED: ICD-10-CM

## 2022-08-13 LAB — AMNISURE ROM (RUPTURE OF MEMBRANES): NEGATIVE — SIGNIFICANT CHANGE UP

## 2022-08-13 PROCEDURE — 76815 OB US LIMITED FETUS(S): CPT | Mod: 26

## 2022-08-13 PROCEDURE — 99213 OFFICE O/P EST LOW 20 MIN: CPT | Mod: 25

## 2022-08-13 PROCEDURE — 59025 FETAL NON-STRESS TEST: CPT | Mod: 26

## 2022-08-13 NOTE — OB PROVIDER TRIAGE NOTE - HISTORY OF PRESENT ILLNESS
36 y/o  @ 35.3 wks gestation presents with c/o " trickling fluid on Thursday" denies any LOF since then denies any uc's vb or lof reports +FM denies any n/v/d denies any fever or chills denies any recent intercourse ap care comp by :   scheduled  2022   hx laparoscopic myomectomy 2020   myomas x's 2

## 2022-08-13 NOTE — OB RN TRIAGE NOTE - FALL HARM RISK - PATIENT NEEDS ASSISTANCE
WC16 received to [facility]  office from [employer].  Sent to [employer] via inter-office mail for processing.   No assistance needed

## 2022-08-13 NOTE — OB PROVIDER TRIAGE NOTE - NSHPPHYSICALEXAM_GEN_ALL_CORE
abdomen: soft, nt on palp  SSE: no evidence of pooling  small amount of leukorrhea noted   fern- neg   nitrazine- neg   amni sure- negative   cervix appears closed and posterior   TAS: BPP: 8/8 vtx YAMILETH: 12.51 posterior placenta   cat 1 FHT  toco: no uterine contractions noted

## 2022-08-13 NOTE — OB RN TRIAGE NOTE - FALL HARM RISK - UNIVERSAL INTERVENTIONS
Bed in lowest position, wheels locked, appropriate side rails in place/Call bell, personal items and telephone in reach/Instruct patient to call for assistance before getting out of bed or chair/Non-slip footwear when patient is out of bed/[place] to call system/Physically safe environment - no spills, clutter or unnecessary equipment/Purposeful Proactive Rounding/Room/bathroom lighting operational, light cord in reach

## 2022-08-13 NOTE — OB PROVIDER TRIAGE NOTE - NSOBPROVIDERNOTE_OBGYN_ALL_OB_FT
36 y/o  @ 35.3 wks gestation presents with ? PPROM on 2022  no evidence of PPROM   maternal and fetal status reassuring  plan of care d/w dr acevedo   discharge home  PTL precautions d/w pt  increase fluid intake  instructed on fetal kick counts  follow  oronsmathieue as sched  w/v discharge instructions given  discharged home

## 2022-08-17 ENCOUNTER — OUTPATIENT (OUTPATIENT)
Dept: INPATIENT UNIT | Facility: HOSPITAL | Age: 37
LOS: 1 days | Discharge: HOME | End: 2022-08-17

## 2022-08-17 VITALS
HEART RATE: 95 BPM | TEMPERATURE: 99 F | SYSTOLIC BLOOD PRESSURE: 119 MMHG | DIASTOLIC BLOOD PRESSURE: 60 MMHG | RESPIRATION RATE: 16 BRPM

## 2022-08-17 DIAGNOSIS — Z3A.00 WEEKS OF GESTATION OF PREGNANCY NOT SPECIFIED: ICD-10-CM

## 2022-08-17 DIAGNOSIS — Z98.890 OTHER SPECIFIED POSTPROCEDURAL STATES: Chronic | ICD-10-CM

## 2022-08-17 DIAGNOSIS — O26.899 OTHER SPECIFIED PREGNANCY RELATED CONDITIONS, UNSPECIFIED TRIMESTER: ICD-10-CM

## 2022-08-17 PROCEDURE — 99213 OFFICE O/P EST LOW 20 MIN: CPT | Mod: 25

## 2022-08-17 PROCEDURE — 76818 FETAL BIOPHYS PROFILE W/NST: CPT | Mod: 26

## 2022-08-17 NOTE — OB PROVIDER TRIAGE NOTE - HISTORY OF PRESENT ILLNESS
38 y/o  @ 36 wks gestation presents with c/o " trickling fluid on Thursday" denies any LOF since then denies any uc's vb or lof reports +FM denies any n/v/d denies any fever or chills denies any recent intercourse ap care comp by :   scheduled  2022   hx laparoscopic myomectomy 2020   myomas x's 2  36 y/o  @ 36 wks gestation presents with c/o leakage of fluid around 1130 AM, decrease fetal movement around 7PM, denies vaginal bleedingsince then denies an +FM denies any n/v/d denies any fever or chills denies any recent intercourse  scheduled  2022   hx laparoscopic myomectomy 2020   myomas x's 2   allergies:  PMH:  36 y/o  @ 36 wks gestation presents with c/o leakage of fluid around 1130 AM x 1, decrease fetal movement around 7PM, denies vaginal bleeding and contractions Denies fever, chills, headaches, changes in vision, chest pain, palpitations, shortness of breath, cough, nausea, vomiting, diarrhea, constipation, urinary symptoms, edema.  scheduled  2022   Prenatal care with Dr. Mayberry  Prenatal course is uncomplicated as per patient    GBS status is unknown  Patient denies signs and symptoms of COVID 19; denies symptomatic illness; fully vaccinated.    No adverse reactions to anesthesia, no objections to blood transfusions if clinically indicated.  OB hx: primigravida  Med hx: denies  Surg hx:  laparoscopic myomectomy   GYN hx: Fibroid S/P Myomectomy denies hx of abnormal papsmear/cysts/STDs  Meds: PNV  Allergies: NKDA    Social hx: Denies alcohol, tobacco, drug use  Psych hx: denies hx of anxiety/depression; lives with spouse

## 2022-08-17 NOTE — OB PROVIDER TRIAGE NOTE - NSOBPROVIDERNOTE_OBGYN_ALL_OB_FT
36 y/o  @ 36 wks gestation R/O Rupture. 36 y/o  @ 36 wks gestation R/O Rupture.  pt stated that she feels movement while in he room and the patient and her partner also see movement when performing Sono 36 y/o  @ 36 wks gestation R/O Rupture.  pt stated that she feels movement while in he room and the patient and her partner also see movement when performing Sono.  [id number] discuss with Dr. Oro  no S/S of Premature of membrane at this time.   stable for discharge  Discharge patient home.  Labor precautions and fetal movements count were reviewed; if not in labor, will follow up with your next schedule appointment.  All ordered tests results reviewed and interpreted.  Plan of care was reviewed with patient and family; patient states understanding of the above plan.  In total 35 minutes spent with established/new patient.

## 2022-08-17 NOTE — OB PROVIDER TRIAGE NOTE - NSHPPHYSICALEXAM_GEN_ALL_CORE
abdomen: soft, nt on palp  SSE: no evidence of pooling  small amount of leukorrhea noted   fern-  nitrazine-  amni sure-   cervix appears closed and posterior   TAS: BPP: 8/8 vtx YAMILETH: 12.51 posterior placenta   cat 1 FHT  toco: no uterine contractions noted abdomen: soft, non tender  SSE: no evidence of pooling  small amount of leukorrhea noted   fern- negative  Nitrazine- negative  amni sure-   cervix appears closed and posterior   TAS: BPP: 8/8 vtx YAMILETH: 11.21 anterior placenta   cat 1 FHT  toco: no uterine contractions noted abdomen: soft, non tender  SSE: no evidence of pooling  small amount of leukorrhea noted   fern- negative  Nitrazine- negative  amni sure- negative  cervix appears closed and posterior   TAS: BPP: 8/8 vtx YAMILETH: 11.21 anterior placenta   cat 1 FHT  toco: no uterine contractions noted

## 2022-08-17 NOTE — OB RN TRIAGE NOTE - CHIEF COMPLAINT QUOTE
Fluid leakage since 1130 and decreased fetal movement .Pt. is scheduled for C/S 08/24 Fluid leakage since 1130 and decreased fetal movement since 1900 .Pt. is scheduled for C/S 08/24

## 2022-08-17 NOTE — OB RN TRIAGE NOTE - NSICDXPASTMEDICALHX_GEN_ALL_CORE_FT
PAST MEDICAL HISTORY:  Fibroid uterus     History of myomectomy 2020     PAST MEDICAL HISTORY:  2019 novel coronavirus disease (COVID-19)     Fibroid uterus

## 2022-08-18 VITALS — HEART RATE: 63 BPM | DIASTOLIC BLOOD PRESSURE: 71 MMHG | SYSTOLIC BLOOD PRESSURE: 116 MMHG

## 2022-08-18 LAB — AMNISURE ROM (RUPTURE OF MEMBRANES): NEGATIVE — SIGNIFICANT CHANGE UP

## 2022-08-23 ENCOUNTER — TRANSCRIPTION ENCOUNTER (OUTPATIENT)
Age: 37
End: 2022-08-23

## 2022-08-24 ENCOUNTER — INPATIENT (INPATIENT)
Facility: HOSPITAL | Age: 37
LOS: 2 days | Discharge: HOME | End: 2022-08-27
Attending: OBSTETRICS & GYNECOLOGY | Admitting: OBSTETRICS & GYNECOLOGY

## 2022-08-24 ENCOUNTER — RESULT REVIEW (OUTPATIENT)
Age: 37
End: 2022-08-24

## 2022-08-24 ENCOUNTER — TRANSCRIPTION ENCOUNTER (OUTPATIENT)
Age: 37
End: 2022-08-24

## 2022-08-24 VITALS — DIASTOLIC BLOOD PRESSURE: 74 MMHG | HEART RATE: 90 BPM | SYSTOLIC BLOOD PRESSURE: 133 MMHG

## 2022-08-24 DIAGNOSIS — Z98.890 OTHER SPECIFIED POSTPROCEDURAL STATES: ICD-10-CM

## 2022-08-24 DIAGNOSIS — Z98.890 OTHER SPECIFIED POSTPROCEDURAL STATES: Chronic | ICD-10-CM

## 2022-08-24 LAB
APTT BLD: 23.9 SEC — LOW (ref 27–36.3)
BASOPHILS # BLD AUTO: 0.01 K/UL — SIGNIFICANT CHANGE UP (ref 0–0.2)
BASOPHILS NFR BLD AUTO: 0.2 % — SIGNIFICANT CHANGE UP (ref 0–2)
BLD GP AB SCN SERPL QL: NEGATIVE — SIGNIFICANT CHANGE UP
COVID-19 SPIKE DOMAIN AB INTERP: POSITIVE
COVID-19 SPIKE DOMAIN ANTIBODY RESULT: >250 U/ML — HIGH
EOSINOPHIL # BLD AUTO: 0.05 K/UL — SIGNIFICANT CHANGE UP (ref 0–0.5)
EOSINOPHIL NFR BLD AUTO: 0.8 % — SIGNIFICANT CHANGE UP (ref 0–6)
FIBRINOGEN PPP-MCNC: 626 MG/DL — HIGH (ref 330–520)
HCT VFR BLD CALC: 33.1 % — LOW (ref 34.5–45)
HGB BLD-MCNC: 10.8 G/DL — LOW (ref 11.5–15.5)
IANC: 3.73 K/UL — SIGNIFICANT CHANGE UP (ref 1.8–7.4)
IMM GRANULOCYTES NFR BLD AUTO: 1.1 % — SIGNIFICANT CHANGE UP (ref 0–1.5)
INR BLD: 1.03 RATIO — SIGNIFICANT CHANGE UP (ref 0.88–1.16)
LYMPHOCYTES # BLD AUTO: 1.53 K/UL — SIGNIFICANT CHANGE UP (ref 1–3.3)
LYMPHOCYTES # BLD AUTO: 25.1 % — SIGNIFICANT CHANGE UP (ref 13–44)
MCHC RBC-ENTMCNC: 29.2 PG — SIGNIFICANT CHANGE UP (ref 27–34)
MCHC RBC-ENTMCNC: 32.6 GM/DL — SIGNIFICANT CHANGE UP (ref 32–36)
MCV RBC AUTO: 89.5 FL — SIGNIFICANT CHANGE UP (ref 80–100)
MONOCYTES # BLD AUTO: 0.7 K/UL — SIGNIFICANT CHANGE UP (ref 0–0.9)
MONOCYTES NFR BLD AUTO: 11.5 % — SIGNIFICANT CHANGE UP (ref 2–14)
NEUTROPHILS # BLD AUTO: 3.73 K/UL — SIGNIFICANT CHANGE UP (ref 1.8–7.4)
NEUTROPHILS NFR BLD AUTO: 61.3 % — SIGNIFICANT CHANGE UP (ref 43–77)
NRBC # BLD: 2 /100 WBCS — HIGH (ref 0–0)
NRBC # FLD: 0.09 K/UL — HIGH (ref 0–0)
PLATELET # BLD AUTO: 106 K/UL — LOW (ref 150–400)
PROTHROM AB SERPL-ACNC: 12 SEC — SIGNIFICANT CHANGE UP (ref 10.5–13.4)
RBC # BLD: 3.7 M/UL — LOW (ref 3.8–5.2)
RBC # FLD: 15.7 % — HIGH (ref 10.3–14.5)
RH IG SCN BLD-IMP: POSITIVE — SIGNIFICANT CHANGE UP
SARS-COV-2 IGG+IGM SERPL QL IA: >250 U/ML — HIGH
SARS-COV-2 IGG+IGM SERPL QL IA: POSITIVE
T PALLIDUM AB TITR SER: NEGATIVE — SIGNIFICANT CHANGE UP
WBC # BLD: 6.09 K/UL — SIGNIFICANT CHANGE UP (ref 3.8–10.5)
WBC # FLD AUTO: 6.09 K/UL — SIGNIFICANT CHANGE UP (ref 3.8–10.5)

## 2022-08-24 PROCEDURE — 59514 CESAREAN DELIVERY ONLY: CPT | Mod: AS,U7

## 2022-08-24 PROCEDURE — 76815 OB US LIMITED FETUS(S): CPT | Mod: 26

## 2022-08-24 PROCEDURE — 88307 TISSUE EXAM BY PATHOLOGIST: CPT | Mod: 26

## 2022-08-24 PROCEDURE — 59025 FETAL NON-STRESS TEST: CPT | Mod: 26

## 2022-08-24 DEVICE — SURGICEL SNOW 2 X 4": Type: IMPLANTABLE DEVICE | Status: FUNCTIONAL

## 2022-08-24 RX ORDER — SODIUM CHLORIDE 9 MG/ML
500 INJECTION, SOLUTION INTRAVENOUS ONCE
Refills: 0 | Status: DISCONTINUED | OUTPATIENT
Start: 2022-08-24 | End: 2022-08-27

## 2022-08-24 RX ORDER — FAMOTIDINE 10 MG/ML
20 INJECTION INTRAVENOUS ONCE
Refills: 0 | Status: DISCONTINUED | OUTPATIENT
Start: 2022-08-24 | End: 2022-08-24

## 2022-08-24 RX ORDER — HEPARIN SODIUM 5000 [USP'U]/ML
5000 INJECTION INTRAVENOUS; SUBCUTANEOUS EVERY 12 HOURS
Refills: 0 | Status: DISCONTINUED | OUTPATIENT
Start: 2022-08-24 | End: 2022-08-27

## 2022-08-24 RX ORDER — SODIUM CHLORIDE 9 MG/ML
1000 INJECTION, SOLUTION INTRAVENOUS ONCE
Refills: 0 | Status: DISCONTINUED | OUTPATIENT
Start: 2022-08-24 | End: 2022-08-27

## 2022-08-24 RX ORDER — SENNA PLUS 8.6 MG/1
1 TABLET ORAL DAILY
Refills: 0 | Status: DISCONTINUED | OUTPATIENT
Start: 2022-08-24 | End: 2022-08-27

## 2022-08-24 RX ORDER — KETOROLAC TROMETHAMINE 30 MG/ML
30 SYRINGE (ML) INJECTION EVERY 6 HOURS
Refills: 0 | Status: DISCONTINUED | OUTPATIENT
Start: 2022-08-24 | End: 2022-08-25

## 2022-08-24 RX ORDER — SODIUM CHLORIDE 9 MG/ML
1000 INJECTION, SOLUTION INTRAVENOUS
Refills: 0 | Status: DISCONTINUED | OUTPATIENT
Start: 2022-08-24 | End: 2022-08-27

## 2022-08-24 RX ORDER — TETANUS TOXOID, REDUCED DIPHTHERIA TOXOID AND ACELLULAR PERTUSSIS VACCINE, ADSORBED 5; 2.5; 8; 8; 2.5 [IU]/.5ML; [IU]/.5ML; UG/.5ML; UG/.5ML; UG/.5ML
0.5 SUSPENSION INTRAMUSCULAR ONCE
Refills: 0 | Status: DISCONTINUED | OUTPATIENT
Start: 2022-08-24 | End: 2022-08-27

## 2022-08-24 RX ORDER — OXYTOCIN 10 UNIT/ML
333.33 VIAL (ML) INJECTION
Qty: 20 | Refills: 0 | Status: DISCONTINUED | OUTPATIENT
Start: 2022-08-24 | End: 2022-08-27

## 2022-08-24 RX ORDER — MAGNESIUM HYDROXIDE 400 MG/1
30 TABLET, CHEWABLE ORAL
Refills: 0 | Status: DISCONTINUED | OUTPATIENT
Start: 2022-08-24 | End: 2022-08-27

## 2022-08-24 RX ORDER — OXYCODONE HYDROCHLORIDE 5 MG/1
5 TABLET ORAL ONCE
Refills: 0 | Status: DISCONTINUED | OUTPATIENT
Start: 2022-08-24 | End: 2022-08-27

## 2022-08-24 RX ORDER — ACETAMINOPHEN 500 MG
1000 TABLET ORAL ONCE
Refills: 0 | Status: DISCONTINUED | OUTPATIENT
Start: 2022-08-24 | End: 2022-08-27

## 2022-08-24 RX ORDER — FAMOTIDINE 10 MG/ML
20 INJECTION INTRAVENOUS ONCE
Refills: 0 | Status: COMPLETED | OUTPATIENT
Start: 2022-08-24 | End: 2022-08-24

## 2022-08-24 RX ORDER — DIPHENHYDRAMINE HCL 50 MG
25 CAPSULE ORAL EVERY 6 HOURS
Refills: 0 | Status: DISCONTINUED | OUTPATIENT
Start: 2022-08-24 | End: 2022-08-27

## 2022-08-24 RX ORDER — SIMETHICONE 80 MG/1
80 TABLET, CHEWABLE ORAL EVERY 4 HOURS
Refills: 0 | Status: DISCONTINUED | OUTPATIENT
Start: 2022-08-24 | End: 2022-08-27

## 2022-08-24 RX ORDER — IBUPROFEN 200 MG
600 TABLET ORAL EVERY 6 HOURS
Refills: 0 | Status: COMPLETED | OUTPATIENT
Start: 2022-08-24 | End: 2023-07-23

## 2022-08-24 RX ORDER — CITRIC ACID/SODIUM CITRATE 300-500 MG
30 SOLUTION, ORAL ORAL ONCE
Refills: 0 | Status: COMPLETED | OUTPATIENT
Start: 2022-08-24 | End: 2022-08-24

## 2022-08-24 RX ORDER — LANOLIN
1 OINTMENT (GRAM) TOPICAL EVERY 6 HOURS
Refills: 0 | Status: DISCONTINUED | OUTPATIENT
Start: 2022-08-24 | End: 2022-08-27

## 2022-08-24 RX ORDER — KETOROLAC TROMETHAMINE 30 MG/ML
30 SYRINGE (ML) INJECTION ONCE
Refills: 0 | Status: DISCONTINUED | OUTPATIENT
Start: 2022-08-24 | End: 2022-08-24

## 2022-08-24 RX ORDER — ACETAMINOPHEN 500 MG
975 TABLET ORAL
Refills: 0 | Status: DISCONTINUED | OUTPATIENT
Start: 2022-08-24 | End: 2022-08-27

## 2022-08-24 RX ORDER — OXYCODONE HYDROCHLORIDE 5 MG/1
5 TABLET ORAL
Refills: 0 | Status: COMPLETED | OUTPATIENT
Start: 2022-08-24 | End: 2022-08-31

## 2022-08-24 RX ORDER — SODIUM CHLORIDE 9 MG/ML
1000 INJECTION, SOLUTION INTRAVENOUS ONCE
Refills: 0 | Status: COMPLETED | OUTPATIENT
Start: 2022-08-24 | End: 2022-08-24

## 2022-08-24 RX ADMIN — Medication 30 MILLILITER(S): at 08:14

## 2022-08-24 RX ADMIN — Medication 1000 MILLIUNIT(S)/MIN: at 12:08

## 2022-08-24 RX ADMIN — SODIUM CHLORIDE 75 MILLILITER(S): 9 INJECTION, SOLUTION INTRAVENOUS at 14:01

## 2022-08-24 RX ADMIN — Medication 30 MILLIGRAM(S): at 12:04

## 2022-08-24 RX ADMIN — Medication 30 MILLIGRAM(S): at 18:30

## 2022-08-24 RX ADMIN — FAMOTIDINE 20 MILLIGRAM(S): 10 INJECTION INTRAVENOUS at 08:14

## 2022-08-24 RX ADMIN — Medication 30 MILLIGRAM(S): at 19:01

## 2022-08-24 RX ADMIN — Medication 30 MILLIGRAM(S): at 12:42

## 2022-08-24 RX ADMIN — SODIUM CHLORIDE 125 MILLILITER(S): 9 INJECTION, SOLUTION INTRAVENOUS at 08:13

## 2022-08-24 RX ADMIN — SODIUM CHLORIDE 2000 MILLILITER(S): 9 INJECTION, SOLUTION INTRAVENOUS at 08:14

## 2022-08-24 RX ADMIN — HEPARIN SODIUM 5000 UNIT(S): 5000 INJECTION INTRAVENOUS; SUBCUTANEOUS at 18:30

## 2022-08-24 NOTE — OB RN DELIVERY SUMMARY - NS_SEPSISRSKCALC_OBGYN_ALL_OB_FT
EOS calculated successfully. EOS Risk Factor: 0.5/1000 live births ([facility] national incidence); GA=37w;Temp=98.1; ROM=0; GBS='Unknown'; Antibiotics='No antibiotics or any antibiotics < 2 hrs prior to birth'

## 2022-08-24 NOTE — OB RN INTRAOPERATIVE NOTE - NSSELHIDDEN_OBGYN_ALL_OB_FT
[NS_DeliveryAttending1_OBGYN_ALL_OB_FT:[id number]],[NS_DeliveryAssist1_OBGYN_ALL_OB_FT:[id number]=],[NS_DeliveryRN_OBGYN_ALL_OB_FT:[id number]=]

## 2022-08-24 NOTE — DISCHARGE NOTE OB - NS MD DC FALL RISK RISK
For information on Fall & Injury Prevention, visit: [email or web address] OR  [email or web address] OR  https://www.cdc.gov/steadi/patient.html

## 2022-08-24 NOTE — DISCHARGE NOTE OB - MEDICATION SUMMARY - MEDICATIONS TO TAKE
I will START or STAY ON the medications listed below when I get home from the hospital:    ibuprofen 600 mg oral tablet  -- 1 tab(s) by mouth every 6 hours, As Needed  -- Indication: For pain    acetaminophen 325 mg oral tablet  -- 3 tab(s) by mouth every 6 hours, As Needed  -- Indication: For pain    lanolin topical ointment  -- 1 application on skin every 6 hours, As needed, Sore Nipples  -- Indication: For nipple soreness

## 2022-08-24 NOTE — OB RN INTRAOPERATIVE NOTE - NS_IMPLANTS_SURGICEL HEMOSTAT FIBRILLAR EXPIRATION DATE_OBGYN_ALL_OB_DT
[FreeTextEntry3] : Written by Crystal Doty, acting as a scribe for Dr. Mike Ruff.\par This note accurately reflects the work and decisions made by me. 
31-May-2024

## 2022-08-24 NOTE — OB PROVIDER H&P - ATTENDING COMMENTS
pt presents for scheduled PCS for prior myomectomy. explained risks of surgery including but not limited to hemorrhage, infection, and damage to nearby organs.  patient understands risks and agrees with plan of care, consent obtained and in chart

## 2022-08-24 NOTE — OB RN DELIVERY SUMMARY - NSSELHIDDEN_OBGYN_ALL_OB_FT
[NS_DeliveryAttending1_OBGYN_ALL_OB_FT:[id number]],[NS_DeliveryAssist1_OBGYN_ALL_OB_FT:[id number]=],[NS_DeliveryRN_OBGYN_ALL_OB_FT:[id number]=]

## 2022-08-24 NOTE — DISCHARGE NOTE OB - PATIENT PORTAL LINK FT
You can access the FollowMyHealth Patient Portal offered by [facility] by registering at the following website: [email or web address]. By joining [employer]’s FollowMyHealth portal, you will also be able to view your health information using other applications (apps) compatible with our system.

## 2022-08-24 NOTE — OB PROVIDER DELIVERY SUMMARY - NSSELHIDDEN_OBGYN_ALL_OB_FT
[NS_DeliveryAttending1_OBGYN_ALL_OB_FT:[id number]],[NS_DeliveryAssist1_OBGYN_ALL_OB_FT:[id number]=],[NS_DeliveryRN_OBGYN_ALL_OB_FT:[id number]=]

## 2022-08-24 NOTE — OB PROVIDER H&P - NS_OBGYNHISTORY_OBGYN_ALL_OB_FT
: current pregnancy    Gyn Hx: fibroid uterus, 2 small fibroids present (h/o myomectomy), Right ovarian cyst, +hpv on pap smear

## 2022-08-24 NOTE — OB PROVIDER H&P - NSHPSOCIALHISTORY_GEN_ALL_CORE
Lives at home with , feels safe. Denies alcohol/tobacco/drug use during and before pregnancy. Denies GC/Chlam/HIV/herpes/STIs.

## 2022-08-24 NOTE — DISCHARGE NOTE OB - CARE PROVIDER_API CALL
Braydon Mayberry)  Obstetrics and Gynecology  [address] [place] [address]  Phone: [phone]  Fax: [phone]  Follow Up Time: 2 weeks

## 2022-08-24 NOTE — OB PROVIDER DELIVERY SUMMARY - NSPROVIDERDELIVERYNOTE_OBGYN_ALL_OB_FT
elective PCS for h/o prior myomectomy   Findings: viable male , APGARS 9/9, weight [id number]   uterus with 3cm left lower segment subserosal myoma, 2 subcentimeter right lower segment subserosal myomas, left fundal subcentimeter subserosal myoma  uterus not exteriorized due to posterior adhesion to large bowel   no anterior intrabdominal adhesions   normal tubes and ovaries bilaterally   qbl 525cc

## 2022-08-24 NOTE — OB PROVIDER H&P - HISTORY OF PRESENT ILLNESS
38yo female  URIAH 22 presents @37w for scheduled pLTCS d/t prior laparoscopic myomectomy +AP course c/w normal 20 week sonogram, normal 1 hr gtt, otherwise unremarkable. Denies SOB, fever, chills or cough. Negative COVID-19 test (22).   Denies VB, ROM or UCs today. +FM    Med Hx: Denies asthma, HTN, DM, CAD, or other medical issues  Meds: PNV, denies other medications including prescribed/OTC   Allergies: NKDA, NKEA, NKFA  Surgical Hx:  robotic laparoscopic myomectomy, 5 fibroids extracted (1 fibroid was 12cm)

## 2022-08-24 NOTE — OB PROVIDER H&P - NSHPREVIEWOFSYSTEMS_GEN_ALL_CORE
General: Denies fever, malaise, body aches  Cardiovascular: denies murmurs or h/o cardio myopathy. Denies palpitations or chest pain  Respiratory: denies cough, wheeze or SOB  Hematological: denies blood clotting disorder, h/o PE or DVT, h/o blood transfusion  Psychological: Denies PPD, depression, anxiety

## 2022-08-24 NOTE — BRIEF OPERATIVE NOTE - NSICDXBRIEFPOSTOP_GEN_ALL_CORE_FT
POST-OP DIAGNOSIS:  Status post primary low transverse  section 24-Aug-2022 14:00:36  Charline Kent

## 2022-08-24 NOTE — LACTATION INITIAL EVALUATION - LACTATION INTERVENTIONS
Mom educated about babies less than 24 hours of age will be sleepy. Made aware of cluster feeding that occurs after 24 hours of life and to be cautious of sleep deprivation in order to maintain infant and mother safety. Instructed to place infant in bassinet or call for assistance if feeling sleepy or tired.Recognition of feeding cues and to feed the baby on demand based on cues at least 8-12 times in a day. Instructed pt. to wake the baby to feed if no feeding cues are seen within 3h since prior feed. Pt. educated on the nutritional needs of the baby, how many wet and dirty diapers to expect, along with the amount of times the baby needs to be placed on the breast at this time.  use  feeding log to record feedings along with wet and dirty diapers. instructed in hand expression with good return demonstration.  Reviewed safe skin to skin. Verbalized understanding of education./initiate/review safe skin-to-skin/initiate/review hand expression/initiate/review techniques for position and latch/review techniques to increase milk supply/review techniques to manage sore nipples/engorgement/initiate/review finger suck/initiate/review breast massage/compression/initiate/review alternate feeding method/reviewed components of an effective feeding and at least 8 effective feedings per day required/reviewed importance of monitoring infant diapers, the breastfeeding log, and minimum output each day/reviewed risks of unnecessary formula supplementation/reviewed risks of artificial nipples/reviewed strategies to transition to breastfeeding only/reviewed benefits and recommendations for rooming in/reviewed feeding on demand/by cue at least 8 times a day/recommended follow-up with pediatrician within 24 hours of discharge/reviewed indications of inadequate milk transfer that would require supplementation

## 2022-08-24 NOTE — OB PROVIDER H&P - NSHPPHYSICALEXAM_GEN_ALL_CORE
Vitals:   General: A&O x3  Heart: RRR, S1 & S2  Lungs: CTA b/l, good inspiratory/expiratory effort. No ronchi, no rales  Abdomen: Soft, Gravid, TOCO in place, **+pfannenstial scar    NST: pending, current Cat 1, +accels, moderate variability, -decels, basleine , TOCO: uterine irritability  Bedside Transabdominal US: cephalic position, anterior placenta, +FHM    Extremities: FROM, bilateral 1+ LE edema  Neuro: grossly intact

## 2022-08-24 NOTE — OB RN PATIENT PROFILE - FALL HARM RISK - UNIVERSAL INTERVENTIONS
Bed in lowest position, wheels locked, appropriate side rails in place/Call bell, personal items and telephone in reach/Instruct patient to call for assistance before getting out of bed or chair/Non-slip footwear when patient is out of bed/[place] to call system/Physically safe environment - no spills, clutter or unnecessary equipment/Purposeful Proactive Rounding/Room/bathroom lighting operational, light cord in reach

## 2022-08-24 NOTE — DISCHARGE NOTE OB - BREAST MILK PROVIDES COLOSTRUM THAT IS HIGH IN PROTEIN
Pt c/o of RLQ pain that started last night. Also c/o of nauseas and chills. Pt denies fevers.
Statement Selected

## 2022-08-24 NOTE — OB PROVIDER H&P - ASSESSMENT
Dx: Scheduled pLTCS 2/2 prior laprascopic myomectomy    - Admit to L&D  - NPO  - EFM/TOCO  - IV access, CBC/T&C/RPR  - Pepcid and Bicitra as per pre-op policy  - 2 units PRBCs  - Anesthesia consult   - Blood transfusion consent  - Operative Consent to be discussed and obtained by Dr. Oro  - Plan to move to OR on time schedule  - Discussed with Dr. Preethi Kent, PAC

## 2022-08-24 NOTE — BRIEF OPERATIVE NOTE - OPERATION/FINDINGS
elective PCS for h/o prior myomectomy   Viable male infant, apgar 9/9, wgt 3120 gms, delivered @0934  Cephalic presentation, clear copious fluid  hysterotomy closed in single layer  Fibrillar placed over hysterotomy  Surgasil powder placed over rectus layer  uterus with 3cm left lower segment subserosal myoma, 2 subcentimeter right lower segment subserosal myomas, left fundal subcentimeter subserosal myoma  uterus not exteriorized due to posterior adhesion to large bowel   no anterior intraabdominal adhesions   otherwise grossly nl uterus, tubes & ovaries    QBL: 525  UOP: 400  IVF: 1800  Dictation Number: elective PCS for h/o prior myomectomy   Viable male infant, apgar 9/9, wgt 3120 gms, delivered @0934  Cephalic presentation, clear copious fluid  hysterotomy closed in single layer  Fibrillar placed over hysterotomy  Surgasil powder placed over rectus layer  uterus with 3cm left lower segment subserosal myoma, 2 subcentimeter right lower segment subserosal myomas, left fundal subcentimeter subserosal myoma  uterus not exteriorized due to posterior adhesion to large bowel   no anterior intraabdominal adhesions   otherwise grossly nl uterus, tubes & ovaries    QBL: 525  UOP: 400  IVF: 1800  Dictation Number: [id number]

## 2022-08-24 NOTE — LACTATION INITIAL EVALUATION - INTERVENTION OUTCOME
Discussed  prevention  and  treatment of  sore nipples using colostrum care and/or lanolin. Instructed in hand expression with good return demonstration. + colostrum noted. Reviewed proper positioning no matter what position she chooses to use: belly to belly, alignment, and supporting the head and shoulders. Assisted mom with latch and positioning. Encouraged deeper latch. Mother and Infant roomed-in.   Mother educated on safe skin to skin care, safe sleep practices and environment. Call bell within reach. Given colostrum via spoon Feed./verbalizes understanding/demonstrates understanding of teaching/good return demonstration/needs met/Lactation team to follow up No Yes

## 2022-08-25 LAB
BASOPHILS # BLD AUTO: 0.02 K/UL — SIGNIFICANT CHANGE UP (ref 0–0.2)
BASOPHILS NFR BLD AUTO: 0.3 % — SIGNIFICANT CHANGE UP (ref 0–2)
EOSINOPHIL # BLD AUTO: 0.01 K/UL — SIGNIFICANT CHANGE UP (ref 0–0.5)
EOSINOPHIL NFR BLD AUTO: 0.1 % — SIGNIFICANT CHANGE UP (ref 0–6)
GIANT PLATELETS BLD QL SMEAR: PRESENT — SIGNIFICANT CHANGE UP
HCT VFR BLD CALC: 27.7 % — LOW (ref 34.5–45)
HGB BLD-MCNC: 9.2 G/DL — LOW (ref 11.5–15.5)
IANC: 5.76 K/UL — SIGNIFICANT CHANGE UP (ref 1.8–7.4)
IMM GRANULOCYTES NFR BLD AUTO: 0.9 % — SIGNIFICANT CHANGE UP (ref 0–1.5)
LYMPHOCYTES # BLD AUTO: 1.36 K/UL — SIGNIFICANT CHANGE UP (ref 1–3.3)
LYMPHOCYTES # BLD AUTO: 17.3 % — SIGNIFICANT CHANGE UP (ref 13–44)
MANUAL SMEAR VERIFICATION: SIGNIFICANT CHANGE UP
MCHC RBC-ENTMCNC: 29.6 PG — SIGNIFICANT CHANGE UP (ref 27–34)
MCHC RBC-ENTMCNC: 33.2 GM/DL — SIGNIFICANT CHANGE UP (ref 32–36)
MCV RBC AUTO: 89.1 FL — SIGNIFICANT CHANGE UP (ref 80–100)
MONOCYTES # BLD AUTO: 0.66 K/UL — SIGNIFICANT CHANGE UP (ref 0–0.9)
MONOCYTES NFR BLD AUTO: 8.4 % — SIGNIFICANT CHANGE UP (ref 2–14)
NEUTROPHILS # BLD AUTO: 5.76 K/UL — SIGNIFICANT CHANGE UP (ref 1.8–7.4)
NEUTROPHILS NFR BLD AUTO: 73 % — SIGNIFICANT CHANGE UP (ref 43–77)
NRBC # BLD: 0 /100 WBCS — SIGNIFICANT CHANGE UP (ref 0–0)
NRBC # FLD: 0.03 K/UL — HIGH (ref 0–0)
PLAT MORPH BLD: ABNORMAL
PLATELET # BLD AUTO: 96 K/UL — LOW (ref 150–400)
PLATELET COUNT - ESTIMATE: ABNORMAL
RBC # BLD: 3.11 M/UL — LOW (ref 3.8–5.2)
RBC # FLD: 15.6 % — HIGH (ref 10.3–14.5)
RBC BLD AUTO: NORMAL — SIGNIFICANT CHANGE UP
WBC # BLD: 7.88 K/UL — SIGNIFICANT CHANGE UP (ref 3.8–10.5)
WBC # FLD AUTO: 7.88 K/UL — SIGNIFICANT CHANGE UP (ref 3.8–10.5)

## 2022-08-25 RX ORDER — IBUPROFEN 200 MG
600 TABLET ORAL EVERY 6 HOURS
Refills: 0 | Status: DISCONTINUED | OUTPATIENT
Start: 2022-08-25 | End: 2022-08-27

## 2022-08-25 RX ORDER — OXYCODONE HYDROCHLORIDE 5 MG/1
5 TABLET ORAL
Refills: 0 | Status: DISCONTINUED | OUTPATIENT
Start: 2022-08-25 | End: 2022-08-27

## 2022-08-25 RX ADMIN — MAGNESIUM HYDROXIDE 30 MILLILITER(S): 400 TABLET, CHEWABLE ORAL at 11:41

## 2022-08-25 RX ADMIN — OXYCODONE HYDROCHLORIDE 5 MILLIGRAM(S): 5 TABLET ORAL at 12:56

## 2022-08-25 RX ADMIN — Medication 975 MILLIGRAM(S): at 21:19

## 2022-08-25 RX ADMIN — Medication 975 MILLIGRAM(S): at 14:35

## 2022-08-25 RX ADMIN — Medication 600 MILLIGRAM(S): at 17:55

## 2022-08-25 RX ADMIN — Medication 30 MILLIGRAM(S): at 00:11

## 2022-08-25 RX ADMIN — OXYCODONE HYDROCHLORIDE 5 MILLIGRAM(S): 5 TABLET ORAL at 06:31

## 2022-08-25 RX ADMIN — Medication 600 MILLIGRAM(S): at 12:00

## 2022-08-25 RX ADMIN — Medication 30 MILLIGRAM(S): at 06:06

## 2022-08-25 RX ADMIN — Medication 975 MILLIGRAM(S): at 10:12

## 2022-08-25 RX ADMIN — Medication 975 MILLIGRAM(S): at 09:12

## 2022-08-25 RX ADMIN — Medication 975 MILLIGRAM(S): at 20:49

## 2022-08-25 RX ADMIN — Medication 30 MILLIGRAM(S): at 07:00

## 2022-08-25 RX ADMIN — SENNA PLUS 1 TABLET(S): 8.6 TABLET ORAL at 06:07

## 2022-08-25 RX ADMIN — HEPARIN SODIUM 5000 UNIT(S): 5000 INJECTION INTRAVENOUS; SUBCUTANEOUS at 17:55

## 2022-08-25 RX ADMIN — Medication 600 MILLIGRAM(S): at 18:37

## 2022-08-25 RX ADMIN — HEPARIN SODIUM 5000 UNIT(S): 5000 INJECTION INTRAVENOUS; SUBCUTANEOUS at 06:06

## 2022-08-25 RX ADMIN — Medication 30 MILLIGRAM(S): at 00:50

## 2022-08-25 RX ADMIN — Medication 600 MILLIGRAM(S): at 23:30

## 2022-08-25 RX ADMIN — OXYCODONE HYDROCHLORIDE 5 MILLIGRAM(S): 5 TABLET ORAL at 13:15

## 2022-08-25 RX ADMIN — Medication 600 MILLIGRAM(S): at 11:41

## 2022-08-25 RX ADMIN — Medication 975 MILLIGRAM(S): at 15:20

## 2022-08-25 RX ADMIN — OXYCODONE HYDROCHLORIDE 5 MILLIGRAM(S): 5 TABLET ORAL at 07:00

## 2022-08-25 RX ADMIN — SIMETHICONE 80 MILLIGRAM(S): 80 TABLET, CHEWABLE ORAL at 06:07

## 2022-08-25 RX ADMIN — SIMETHICONE 80 MILLIGRAM(S): 80 TABLET, CHEWABLE ORAL at 11:41

## 2022-08-25 NOTE — PROGRESS NOTE ADULT - SUBJECTIVE AND OBJECTIVE BOX
Day ___1 of Anesthesia Pain Management Service    SUBJECTIVE:  Pain Scale Score	At rest: __none_ 	With Activity: __mild_ 	[ ] Refer to charted pain scores    THERAPY:    s/p _____0.1___ mg PF morphine     OBJECTIVE:    Sedation Score:	[ x] Alert	[ ] Drowsy	[ ] Arousable	[ ] Asleep	[ ] Unresponsive    Side Effects:	[x ] None	[ ] Nausea	[ ] Vomiting	[ ] Pruritus  		  [ ] Weakness		[ ] Numbness	[ ] Other:    Vital Signs Last 24 Hrs  T(C): 36.8 (25 Aug 2022 06:10), Max: 37 (25 Aug 2022 02:00)  T(F): 98.2 (25 Aug 2022 06:10), Max: 98.6 (25 Aug 2022 02:00)  HR: 87 (25 Aug 2022 06:10) (59 - 87)  BP: 118/65 (25 Aug 2022 06:10) (91/73 - 132/81)  BP(mean): 87 (24 Aug 2022 16:30) (64 - 115)  RR: 19 (25 Aug 2022 06:10) (16 - 28)  SpO2: 99% (25 Aug 2022 06:10) (96% - 100%)    Parameters below as of 25 Aug 2022 06:10  Patient On (Oxygen Delivery Method): room air        ASSESSMENT/ PLAN  [x ] Patient transitioned to prn analgesics  [ x] Pain management per primary service, pain service to sign off   [ x]Documentation and Verification of current medications     Comments: No anesthetic complications.

## 2022-08-25 NOTE — PROGRESS NOTE ADULT - ASSESSMENT
Assessment and Plan  POD #1 s/p P/C/S for fibroid uterus.     Her pain is well controlled.   She is tolerating a regular diet and passing flatus.   Denies N/V. Denies CP/SOB/lightheadedness/dizziness.   She is ambulating without difficulty.   Voiding spontaneously.    Patient is stable and doing well post-operatively.    - Continue regular diet.  - Increase ambulation.  - Continue motrin, tylenol, oxycodone PRN for pain control.   -Encourage breastfeeding.  -Incisional care and PO instructions reviewed.     discharge planning, patient would like to be discharge on Saturday morning    Discussed with MD Kimberly Worley

## 2022-08-25 NOTE — PROGRESS NOTE ADULT - SUBJECTIVE AND OBJECTIVE BOX
Post-Operative Note, C/S  She is a  37y woman who is now post-operative day:     Subjective:  The patient feels well.  She is ambulating.   She is tolerating regular diet.  She denies nausea and vomiting; denies fever.  She is voiding.  Her pain is controlled; incisional pain is appropriate.  She reports normal postpartum bleeding.  She is breastfeeding.  She is formula feeding.    Physical exam:    Vital Signs Last 24 Hrs  T(C): 36.8 (25 Aug 2022 06:10), Max: 37 (25 Aug 2022 02:00)  T(F): 98.2 (25 Aug 2022 06:10), Max: 98.6 (25 Aug 2022 02:00)  HR: 87 (25 Aug 2022 06:10) (59 - 87)  BP: 118/65 (25 Aug 2022 06:10) (91/73 - 132/81)  BP(mean): 87 (24 Aug 2022 16:30) (64 - 115)  RR: 19 (25 Aug 2022 06:10) (16 - 28)  SpO2: 99% (25 Aug 2022 06:10) (96% - 100%)    Parameters below as of 25 Aug 2022 06:10  Patient On (Oxygen Delivery Method): room air        Gen: NAD  Breast: Soft, nontender, not engorged.  Abdomen: Soft, nontender, no distension , firm uterine fundus at umbilicus.  Incision: C/D/I.  Pelvic: Normal lochia noted  Ext: No calf tenderness    LABS:                        9.2    7.88  )-----------( 96       ( 25 Aug 2022 06:26 )             27.7       Rubella status:     Allergies    No Known Allergies    Intolerances      MEDICATIONS  (STANDING):  acetaminophen     Tablet .. 975 milliGRAM(s) Oral <User Schedule>  diphtheria/tetanus/pertussis (acellular) Vaccine (ADAcel) 0.5 milliLiter(s) IntraMuscular once  heparin   Injectable 5000 Unit(s) SubCutaneous every 12 hours  ibuprofen  Tablet. 600 milliGRAM(s) Oral every 6 hours  lactated ringers Bolus 500 milliLiter(s) IV Bolus once  lactated ringers Bolus 1000 milliLiter(s) IV Bolus once  lactated ringers. 1000 milliLiter(s) (125 mL/Hr) IV Continuous <Continuous>  lactated ringers. 1000 milliLiter(s) (125 mL/Hr) IV Continuous <Continuous>  lactated ringers. 1000 milliLiter(s) (75 mL/Hr) IV Continuous <Continuous>  oxytocin Infusion 333.333 milliUNIT(s)/Min (1000 mL/Hr) IV Continuous <Continuous>    MEDICATIONS  (PRN):  acetaminophen   IVPB .. 1000 milliGRAM(s) IV Intermittent once PRN Mild Pain (1 - 3)  diphenhydrAMINE 25 milliGRAM(s) Oral every 6 hours PRN Pruritus  lanolin Ointment 1 Application(s) Topical every 6 hours PRN Sore Nipples  magnesium hydroxide Suspension 30 milliLiter(s) Oral two times a day PRN Constipation  oxyCODONE    IR 5 milliGRAM(s) Oral once PRN Moderate to Severe Pain (4-10)  oxyCODONE    IR 5 milliGRAM(s) Oral every 3 hours PRN Moderate to Severe Pain (4-10)  senna 1 Tablet(s) Oral daily PRN Constipation  simethicone 80 milliGRAM(s) Chew every 4 hours PRN Gas

## 2022-08-26 RX ORDER — IBUPROFEN 200 MG
1 TABLET ORAL
Qty: 0 | Refills: 0 | DISCHARGE
Start: 2022-08-26

## 2022-08-26 RX ORDER — ACETAMINOPHEN 500 MG
3 TABLET ORAL
Qty: 0 | Refills: 0 | DISCHARGE
Start: 2022-08-26

## 2022-08-26 RX ORDER — ERGOCALCIFEROL 1.25 MG/1
1 CAPSULE ORAL
Qty: 0 | Refills: 0 | DISCHARGE

## 2022-08-26 RX ORDER — LANOLIN
1 OINTMENT (GRAM) TOPICAL
Qty: 0 | Refills: 0 | DISCHARGE
Start: 2022-08-26

## 2022-08-26 RX ADMIN — Medication 600 MILLIGRAM(S): at 23:22

## 2022-08-26 RX ADMIN — Medication 975 MILLIGRAM(S): at 13:37

## 2022-08-26 RX ADMIN — SENNA PLUS 1 TABLET(S): 8.6 TABLET ORAL at 05:32

## 2022-08-26 RX ADMIN — Medication 600 MILLIGRAM(S): at 05:02

## 2022-08-26 RX ADMIN — HEPARIN SODIUM 5000 UNIT(S): 5000 INJECTION INTRAVENOUS; SUBCUTANEOUS at 05:02

## 2022-08-26 RX ADMIN — Medication 975 MILLIGRAM(S): at 14:21

## 2022-08-26 RX ADMIN — HEPARIN SODIUM 5000 UNIT(S): 5000 INJECTION INTRAVENOUS; SUBCUTANEOUS at 16:58

## 2022-08-26 RX ADMIN — Medication 600 MILLIGRAM(S): at 11:11

## 2022-08-26 RX ADMIN — SIMETHICONE 80 MILLIGRAM(S): 80 TABLET, CHEWABLE ORAL at 23:22

## 2022-08-26 RX ADMIN — Medication 600 MILLIGRAM(S): at 17:31

## 2022-08-26 RX ADMIN — SIMETHICONE 80 MILLIGRAM(S): 80 TABLET, CHEWABLE ORAL at 11:13

## 2022-08-26 RX ADMIN — Medication 600 MILLIGRAM(S): at 12:00

## 2022-08-26 RX ADMIN — Medication 600 MILLIGRAM(S): at 00:00

## 2022-08-26 RX ADMIN — Medication 975 MILLIGRAM(S): at 20:19

## 2022-08-26 RX ADMIN — Medication 600 MILLIGRAM(S): at 05:32

## 2022-08-26 RX ADMIN — Medication 600 MILLIGRAM(S): at 16:57

## 2022-08-26 RX ADMIN — Medication 975 MILLIGRAM(S): at 02:04

## 2022-08-26 RX ADMIN — Medication 975 MILLIGRAM(S): at 02:34

## 2022-08-26 RX ADMIN — Medication 975 MILLIGRAM(S): at 21:10

## 2022-08-26 RX ADMIN — MAGNESIUM HYDROXIDE 30 MILLILITER(S): 400 TABLET, CHEWABLE ORAL at 11:12

## 2022-08-26 NOTE — PROGRESS NOTE ADULT - SUBJECTIVE AND OBJECTIVE BOX
Post-Operative Note, C/S  She is a  37y woman who is now post-operative day: 2    Subjective:  The patient feels well.  She is ambulating.   She is tolerating regular diet.  She denies nausea and vomiting; denies fever.  She is voiding.  Her pain is controlled; incisional pain is appropriate.  She reports normal postpartum bleeding.  She is breastfeeding and formula feeding.    Physical exam:    Vital Signs Last 24 Hrs  T(C): 36.8 (26 Aug 2022 06:32), Max: 36.8 (26 Aug 2022 06:32)  T(F): 98.2 (26 Aug 2022 06:32), Max: 98.2 (26 Aug 2022 06:32)  HR: 80 (26 Aug 2022 06:32) (76 - 80)  BP: 116/63 (26 Aug 2022 06:32) (116/63 - 132/72)  BP(mean): --  RR: 18 (26 Aug 2022 06:32) (18 - 18)  SpO2: 99% (26 Aug 2022 06:32) (97% - 99%)    Parameters below as of 26 Aug 2022 06:32  Patient On (Oxygen Delivery Method): room air        Gen: NAD  Breast: Soft, nontender, not engorged.  Abdomen: Soft, nontender, no distension , firm uterine fundus at umbilicus.  Incision: C/D/I.  Pelvic: Normal lochia noted  Ext: No calf tenderness    LABS:                        9.2    7.88  )-----------( 96       ( 25 Aug 2022 06:26 )             27.7         Allergies    No Known Allergies        MEDICATIONS  (STANDING):  acetaminophen     Tablet .. 975 milliGRAM(s) Oral <User Schedule>  diphtheria/tetanus/pertussis (acellular) Vaccine (ADAcel) 0.5 milliLiter(s) IntraMuscular once  heparin   Injectable 5000 Unit(s) SubCutaneous every 12 hours  ibuprofen  Tablet. 600 milliGRAM(s) Oral every 6 hours  lactated ringers Bolus 500 milliLiter(s) IV Bolus once  lactated ringers Bolus 1000 milliLiter(s) IV Bolus once  lactated ringers. 1000 milliLiter(s) (125 mL/Hr) IV Continuous <Continuous>  lactated ringers. 1000 milliLiter(s) (125 mL/Hr) IV Continuous <Continuous>  lactated ringers. 1000 milliLiter(s) (75 mL/Hr) IV Continuous <Continuous>  oxytocin Infusion 333.333 milliUNIT(s)/Min (1000 mL/Hr) IV Continuous <Continuous>    MEDICATIONS  (PRN):  acetaminophen   IVPB .. 1000 milliGRAM(s) IV Intermittent once PRN Mild Pain (1 - 3)  diphenhydrAMINE 25 milliGRAM(s) Oral every 6 hours PRN Pruritus  lanolin Ointment 1 Application(s) Topical every 6 hours PRN Sore Nipples  magnesium hydroxide Suspension 30 milliLiter(s) Oral two times a day PRN Constipation  oxyCODONE    IR 5 milliGRAM(s) Oral once PRN Moderate to Severe Pain (4-10)  oxyCODONE    IR 5 milliGRAM(s) Oral every 3 hours PRN Moderate to Severe Pain (4-10)  senna 1 Tablet(s) Oral daily PRN Constipation  simethicone 80 milliGRAM(s) Chew every 4 hours PRN Gas

## 2022-08-26 NOTE — PROGRESS NOTE ADULT - ASSESSMENT
Assessment and Plan  POD # 2 s/p C/S.  Doing well and bonding with baby  Acute blood loss anemia  ·	increase po hydration  ·	 c/w iron and vitamin c- pt has rx already  ·	bleeding precautions  ·	fall precautions  Encourage ambulation.  Incisional care and PO instructions reviewed.  CPC.

## 2022-08-27 VITALS
OXYGEN SATURATION: 99 % | SYSTOLIC BLOOD PRESSURE: 123 MMHG | RESPIRATION RATE: 19 BRPM | HEART RATE: 68 BPM | DIASTOLIC BLOOD PRESSURE: 72 MMHG | TEMPERATURE: 98 F

## 2022-08-27 LAB
ALBUMIN SERPL ELPH-MCNC: 2.7 G/DL — LOW (ref 3.3–5)
ALP SERPL-CCNC: 107 U/L — SIGNIFICANT CHANGE UP (ref 40–120)
ALT FLD-CCNC: 25 U/L — SIGNIFICANT CHANGE UP (ref 4–33)
ANION GAP SERPL CALC-SCNC: 11 MMOL/L — SIGNIFICANT CHANGE UP (ref 7–14)
APTT BLD: 25.6 SEC — LOW (ref 27–36.3)
AST SERPL-CCNC: 34 U/L — HIGH (ref 4–32)
BASOPHILS # BLD AUTO: 0.02 K/UL — SIGNIFICANT CHANGE UP (ref 0–0.2)
BASOPHILS NFR BLD AUTO: 0.3 % — SIGNIFICANT CHANGE UP (ref 0–2)
BILIRUB SERPL-MCNC: <0.2 MG/DL — SIGNIFICANT CHANGE UP (ref 0.2–1.2)
BUN SERPL-MCNC: 10 MG/DL — SIGNIFICANT CHANGE UP (ref 7–23)
CALCIUM SERPL-MCNC: 8.4 MG/DL — SIGNIFICANT CHANGE UP (ref 8.4–10.5)
CHLORIDE SERPL-SCNC: 109 MMOL/L — HIGH (ref 98–107)
CO2 SERPL-SCNC: 20 MMOL/L — LOW (ref 22–31)
CREAT SERPL-MCNC: 0.89 MG/DL — SIGNIFICANT CHANGE UP (ref 0.5–1.3)
EGFR: 86 ML/MIN/1.73M2 — SIGNIFICANT CHANGE UP
EOSINOPHIL # BLD AUTO: 0.07 K/UL — SIGNIFICANT CHANGE UP (ref 0–0.5)
EOSINOPHIL NFR BLD AUTO: 0.9 % — SIGNIFICANT CHANGE UP (ref 0–6)
FIBRINOGEN PPP-MCNC: 647 MG/DL — HIGH (ref 330–520)
GLUCOSE SERPL-MCNC: 85 MG/DL — SIGNIFICANT CHANGE UP (ref 70–99)
HCT VFR BLD CALC: 29.5 % — LOW (ref 34.5–45)
HGB BLD-MCNC: 9.6 G/DL — LOW (ref 11.5–15.5)
IANC: 5.76 K/UL — SIGNIFICANT CHANGE UP (ref 1.8–7.4)
IMM GRANULOCYTES NFR BLD AUTO: 0.9 % — SIGNIFICANT CHANGE UP (ref 0–1.5)
INR BLD: 1 RATIO — SIGNIFICANT CHANGE UP (ref 0.88–1.16)
LDH SERPL L TO P-CCNC: 320 U/L — HIGH (ref 135–225)
LYMPHOCYTES # BLD AUTO: 1.05 K/UL — SIGNIFICANT CHANGE UP (ref 1–3.3)
LYMPHOCYTES # BLD AUTO: 13.6 % — SIGNIFICANT CHANGE UP (ref 13–44)
MCHC RBC-ENTMCNC: 30 PG — SIGNIFICANT CHANGE UP (ref 27–34)
MCHC RBC-ENTMCNC: 32.5 GM/DL — SIGNIFICANT CHANGE UP (ref 32–36)
MCV RBC AUTO: 92.2 FL — SIGNIFICANT CHANGE UP (ref 80–100)
MONOCYTES # BLD AUTO: 0.74 K/UL — SIGNIFICANT CHANGE UP (ref 0–0.9)
MONOCYTES NFR BLD AUTO: 9.6 % — SIGNIFICANT CHANGE UP (ref 2–14)
NEUTROPHILS # BLD AUTO: 5.76 K/UL — SIGNIFICANT CHANGE UP (ref 1.8–7.4)
NEUTROPHILS NFR BLD AUTO: 74.7 % — SIGNIFICANT CHANGE UP (ref 43–77)
NRBC # BLD: 0 /100 WBCS — SIGNIFICANT CHANGE UP (ref 0–0)
NRBC # FLD: 0.03 K/UL — HIGH (ref 0–0)
PLATELET # BLD AUTO: 129 K/UL — LOW (ref 150–400)
POTASSIUM SERPL-MCNC: 4.6 MMOL/L — SIGNIFICANT CHANGE UP (ref 3.5–5.3)
POTASSIUM SERPL-SCNC: 4.6 MMOL/L — SIGNIFICANT CHANGE UP (ref 3.5–5.3)
PROT SERPL-MCNC: 5.5 G/DL — LOW (ref 6–8.3)
PROTHROM AB SERPL-ACNC: 11.6 SEC — SIGNIFICANT CHANGE UP (ref 10.5–13.4)
RBC # BLD: 3.2 M/UL — LOW (ref 3.8–5.2)
RBC # FLD: 15.9 % — HIGH (ref 10.3–14.5)
SODIUM SERPL-SCNC: 140 MMOL/L — SIGNIFICANT CHANGE UP (ref 135–145)
URATE SERPL-MCNC: 6.3 MG/DL — SIGNIFICANT CHANGE UP (ref 2.5–7)
WBC # BLD: 7.71 K/UL — SIGNIFICANT CHANGE UP (ref 3.8–10.5)
WBC # FLD AUTO: 7.71 K/UL — SIGNIFICANT CHANGE UP (ref 3.8–10.5)

## 2022-08-27 PROCEDURE — 76536 US EXAM OF HEAD AND NECK: CPT | Mod: 26

## 2022-08-27 RX ORDER — ONDANSETRON 8 MG/1
8 TABLET, FILM COATED ORAL ONCE
Refills: 0 | Status: DISCONTINUED | OUTPATIENT
Start: 2022-08-27 | End: 2022-08-27

## 2022-08-27 RX ADMIN — HEPARIN SODIUM 5000 UNIT(S): 5000 INJECTION INTRAVENOUS; SUBCUTANEOUS at 05:21

## 2022-08-27 RX ADMIN — Medication 975 MILLIGRAM(S): at 03:14

## 2022-08-27 RX ADMIN — Medication 975 MILLIGRAM(S): at 03:45

## 2022-08-27 RX ADMIN — Medication 975 MILLIGRAM(S): at 16:37

## 2022-08-27 RX ADMIN — Medication 975 MILLIGRAM(S): at 10:38

## 2022-08-27 RX ADMIN — SIMETHICONE 80 MILLIGRAM(S): 80 TABLET, CHEWABLE ORAL at 05:21

## 2022-08-27 RX ADMIN — Medication 600 MILLIGRAM(S): at 05:21

## 2022-08-27 RX ADMIN — Medication 600 MILLIGRAM(S): at 14:37

## 2022-08-27 RX ADMIN — Medication 975 MILLIGRAM(S): at 11:00

## 2022-08-27 RX ADMIN — Medication 600 MILLIGRAM(S): at 05:59

## 2022-08-27 RX ADMIN — Medication 600 MILLIGRAM(S): at 00:15

## 2022-08-27 NOTE — PROGRESS NOTE ADULT - SUBJECTIVE AND OBJECTIVE BOX
NP provider note:    Patient seen at bedside resting comfortably offers no new complaints. + Ambulation, + void without difficulty, + flatus;  no bm;  tolerating regular diet. both breastfeeding and bottle feeding. Bonding well w [place].  Denies HA, blurry vision or epigastric pain, CP, SOB, N/V/D,  dizziness, palpitations, worsening vaginal bleeding.     Vital Signs Last 24 Hrs  T(C): 36.9 (27 Aug 2022 06:02), Max: 36.9 (26 Aug 2022 14:00)  T(F): 98.5 (27 Aug 2022 06:02), Max: 98.5 (26 Aug 2022 14:00)  HR: 78 (27 Aug 2022 06:02) (73 - 78)  BP: 134/74 (27 Aug 2022 06:02) (104/63 - 134/74)  BP(mean): --  RR: 18 (27 Aug 2022 06:02) (18 - 18)  SpO2: 100% (27 Aug 2022 06:02) (100% - 100%)    Parameters below as of 27 Aug 2022 06:02  Patient On (Oxygen Delivery Method): room air        Gen: A&O x 3, NAD  Chest: CTA B/L  Cardiac: S1,S2  RRR  Breast: Soft, nontender, nonengorged  Abdomen: +BS; soft; Nontender, nondistended, Incision C/D/I   Gyn: Minimal lochia  Extremities: Nontender, DTRS 2+, no worsening edema  Neck: Left side of neck appears swollen and enlarge, no node palpated, no tenderness to area        Assessment and Plan:   · Assessment	  Assessment and Plan  POD # 3 s/p C/S.  Doing well and bonding with baby  Acute blood loss anemia  ·	increase po hydration  ·	c/w iron and vitamin c- pt has rx already  ·	PT desires short course of oxycodone for discharge, approved by Dr Mayberry  ·	bleeding precautions  ·	fall precautions    Pt reports the "neck swelling" has been present for a few weeks  Awaiting neck sonogram  HELLP labs ordered for decreasing Platelets, MD mayberry notified and Preethi  Encourage ambulation.  Incisional care and PO instructions reviewed.  [facility].  Plan for Discharge     Dr Preethi Herron AGNP-c  [phone]   NP provider note:    Patient seen at bedside resting comfortably offers no new complaints. + Ambulation, + void without difficulty, + flatus;  no bm;  tolerating regular diet. both breastfeeding and bottle feeding. Bonding well w [place].  Denies HA, blurry vision or epigastric pain, CP, SOB, N/V/D,  dizziness, palpitations, worsening vaginal bleeding.     Vital Signs Last 24 Hrs  T(C): 36.9 (27 Aug 2022 06:02), Max: 36.9 (26 Aug 2022 14:00)  T(F): 98.5 (27 Aug 2022 06:02), Max: 98.5 (26 Aug 2022 14:00)  HR: 78 (27 Aug 2022 06:02) (73 - 78)  BP: 134/74 (27 Aug 2022 06:02) (104/63 - 134/74)  BP(mean): --  RR: 18 (27 Aug 2022 06:02) (18 - 18)  SpO2: 100% (27 Aug 2022 06:02) (100% - 100%)    Parameters below as of 27 Aug 2022 06:02  Patient On (Oxygen Delivery Method): room air        Gen: A&O x 3, NAD  Chest: CTA B/L  Cardiac: S1,S2  RRR  Breast: Soft, nontender, nonengorged  Abdomen: +BS; soft; Nontender, nondistended, Incision C/D/I   Gyn: Minimal lochia  Extremities: Nontender, DTRS 2+, no worsening edema  Neck: Left side of neck appears swollen and enlarge, no node palpated, no tenderness to area, Likely soft tissue swelling        Assessment and Plan:   · Assessment	  Assessment and Plan  POD # 3 s/p C/S.  Doing well and bonding with baby  Acute blood loss anemia  ·	increase po hydration  ·	c/w iron and vitamin c- pt has rx already  ·	PT desires short course of oxycodone for discharge, approved by Dr Mayberry  ·	bleeding precautions  ·	fall precautions    Pt reports the "neck swelling" has been present for a few weeks Awaiting neck sonogram  HELLP labs ordered for decreasing Platelets, MD mayberry notified and Preethi  Encourage ambulation.  Incisional care and PO instructions reviewed.  CPC.  Plan for Discharge    DW Dr Preethi Herron AGNP-c  [phone]

## 2022-09-24 LAB — SURGICAL PATHOLOGY STUDY: SIGNIFICANT CHANGE UP

## 2022-12-29 NOTE — H&P PST ADULT - FEMALE-SPECIFIC SYMPTOMS
pelvic pain Double Island Pedicle Flap Text: The defect edges were debeveled with a #15 scalpel blade.  Given the location of the defect, shape of the defect and the proximity to free margins a double island pedicle advancement flap was deemed most appropriate.  Using a sterile surgical marker, an appropriate advancement flap was drawn incorporating the defect, outlining the appropriate donor tissue and placing the expected incisions within the relaxed skin tension lines where possible.    The area thus outlined was incised deep to adipose tissue with a #15 scalpel blade.  The skin margins were undermined to an appropriate distance in all directions around the primary defect and laterally outward around the island pedicle utilizing iris scissors.  There was minimal undermining beneath the pedicle flap.

## 2023-08-02 ENCOUNTER — NON-APPOINTMENT (OUTPATIENT)
Age: 38
End: 2023-08-02

## 2023-12-27 NOTE — ASU DISCHARGE PLAN (ADULT/PEDIATRIC) - CARE PROVIDER_API CALL
Form placed in mail  
Andres Willams (MD)  Obstetrics and Gynecology  [facility], [address] [place] [address]  Phone: [phone]  Fax: [phone]  Follow Up Time:

## 2024-01-01 NOTE — OB RN TRIAGE NOTE - NSICDXPASTMEDICALHX_GEN_ALL_CORE_FT
PAST MEDICAL HISTORY:  Fibroid uterus     History of myomectomy 2020     For information on Fall & Injury Prevention, visit: [email or web address] OR  [email or web address] OR  https://www.cdc.gov/steadi/patient.html

## 2024-01-11 PROBLEM — U07.1 COVID-19: Chronic | Status: ACTIVE | Noted: 2022-08-17

## 2024-01-16 ENCOUNTER — LABORATORY RESULT (OUTPATIENT)
Age: 39
End: 2024-01-16

## 2024-01-16 ENCOUNTER — APPOINTMENT (OUTPATIENT)
Dept: ANTEPARTUM | Facility: CLINIC | Age: 39
End: 2024-01-16
Payer: COMMERCIAL

## 2024-01-16 ENCOUNTER — ASOB RESULT (OUTPATIENT)
Age: 39
End: 2024-01-16

## 2024-01-16 PROCEDURE — 76801 OB US < 14 WKS SINGLE FETUS: CPT

## 2024-01-16 PROCEDURE — 76813 OB US NUCHAL MEAS 1 GEST: CPT | Mod: 59

## 2024-02-27 ENCOUNTER — NON-APPOINTMENT (OUTPATIENT)
Age: 39
End: 2024-02-27

## 2024-02-28 DIAGNOSIS — O34.29 MATERNAL CARE DUE TO UTERINE SCAR FROM OTHER PREVIOUS SURGERY: ICD-10-CM

## 2024-02-29 ENCOUNTER — NON-APPOINTMENT (OUTPATIENT)
Age: 39
End: 2024-02-29

## 2024-03-05 ENCOUNTER — APPOINTMENT (OUTPATIENT)
Dept: ANTEPARTUM | Facility: CLINIC | Age: 39
End: 2024-03-05
Payer: COMMERCIAL

## 2024-03-05 ENCOUNTER — ASOB RESULT (OUTPATIENT)
Age: 39
End: 2024-03-05

## 2024-03-05 PROCEDURE — 76811 OB US DETAILED SNGL FETUS: CPT

## 2024-03-12 ENCOUNTER — APPOINTMENT (OUTPATIENT)
Dept: OBGYN | Facility: CLINIC | Age: 39
End: 2024-03-12

## 2024-03-13 ENCOUNTER — NON-APPOINTMENT (OUTPATIENT)
Age: 39
End: 2024-03-13

## 2024-03-13 ENCOUNTER — APPOINTMENT (OUTPATIENT)
Dept: OBGYN | Facility: CLINIC | Age: 39
End: 2024-03-13
Payer: COMMERCIAL

## 2024-03-13 PROCEDURE — 0500F INITIAL PRENATAL CARE VISIT: CPT

## 2024-03-24 NOTE — PROGRESS NOTE ADULT - PROBLEM SELECTOR PLAN 1
Neuro: PO Analgesia PRN   CV: Hemodynamically stable  Pulm: Saturating well on room air  GI: Advance to regular diet. Anti-emetics PRN.  : voiding freely  Dispo: Discharge from PACU when criteria met, will send home with Naproxen prescription    Leida Segundo PGY1 - c/w Home Valium (Confirmed via i[employer])  - pt is very tearful, anxious and frustrated about current clinical status  - emotional support provided  - she declines Behavioural Health consult

## 2024-04-04 ENCOUNTER — ASOB RESULT (OUTPATIENT)
Age: 39
End: 2024-04-04

## 2024-04-04 ENCOUNTER — APPOINTMENT (OUTPATIENT)
Dept: OBGYN | Facility: CLINIC | Age: 39
End: 2024-04-04
Payer: COMMERCIAL

## 2024-04-04 PROCEDURE — 0502F SUBSEQUENT PRENATAL CARE: CPT

## 2024-04-04 PROCEDURE — 76817 TRANSVAGINAL US OBSTETRIC: CPT

## 2024-04-09 LAB
BASOPHILS # BLD AUTO: 0.01 K/UL
BASOPHILS NFR BLD AUTO: 0.1 %
EOSINOPHIL # BLD AUTO: 0.03 K/UL
EOSINOPHIL NFR BLD AUTO: 0.4 %
GLUCOSE 1H P 50 G GLC PO SERPL-MCNC: 118 MG/DL
HCT VFR BLD CALC: 33.6 %
HGB BLD-MCNC: 11.3 G/DL
IMM GRANULOCYTES NFR BLD AUTO: 0.6 %
LYMPHOCYTES # BLD AUTO: 1.52 K/UL
LYMPHOCYTES NFR BLD AUTO: 19.3 %
MAN DIFF?: NORMAL
MCHC RBC-ENTMCNC: 30.5 PG
MCHC RBC-ENTMCNC: 33.6 GM/DL
MCV RBC AUTO: 90.6 FL
MONOCYTES # BLD AUTO: 0.41 K/UL
MONOCYTES NFR BLD AUTO: 5.2 %
NEUTROPHILS # BLD AUTO: 5.84 K/UL
NEUTROPHILS NFR BLD AUTO: 74.4 %
PLATELET # BLD AUTO: 156 K/UL
RBC # BLD: 3.71 M/UL
RBC # FLD: 14.7 %
WBC # FLD AUTO: 7.86 K/UL

## 2024-04-10 RX ORDER — CHLORHEXIDINE GLUCONATE 4 %
325 (65 FE) LIQUID (ML) TOPICAL DAILY
Qty: 30 | Refills: 0 | Status: ACTIVE | COMMUNITY
Start: 2024-04-10 | End: 1900-01-01

## 2024-05-02 NOTE — ED PROVIDER NOTE - NSICDXPASTMEDICALHX_GEN_ALL_CORE_FT
PAST MEDICAL HISTORY:  Fibroid uterus      Patient called and informed of laB.  Patient verbalizes understanding of recommendation by MD to do exercise and low cholesterol diet.  Patient and wife are well informed of the lab results and the necessary changes to improve them.   No further questions.       No

## 2024-05-09 ENCOUNTER — APPOINTMENT (OUTPATIENT)
Dept: ANTEPARTUM | Facility: CLINIC | Age: 39
End: 2024-05-09
Payer: COMMERCIAL

## 2024-05-09 ENCOUNTER — APPOINTMENT (OUTPATIENT)
Dept: OBGYN | Facility: CLINIC | Age: 39
End: 2024-05-09
Payer: COMMERCIAL

## 2024-05-09 ENCOUNTER — ASOB RESULT (OUTPATIENT)
Age: 39
End: 2024-05-09

## 2024-05-09 PROCEDURE — 76819 FETAL BIOPHYS PROFIL W/O NST: CPT | Mod: 59

## 2024-05-09 PROCEDURE — 0502F SUBSEQUENT PRENATAL CARE: CPT

## 2024-05-09 PROCEDURE — 76816 OB US FOLLOW-UP PER FETUS: CPT

## 2024-05-28 ENCOUNTER — APPOINTMENT (OUTPATIENT)
Dept: OBGYN | Facility: CLINIC | Age: 39
End: 2024-05-28
Payer: COMMERCIAL

## 2024-05-28 ENCOUNTER — NON-APPOINTMENT (OUTPATIENT)
Age: 39
End: 2024-05-28

## 2024-05-28 DIAGNOSIS — Z23 ENCOUNTER FOR IMMUNIZATION: ICD-10-CM

## 2024-05-28 PROCEDURE — 90471 IMMUNIZATION ADMIN: CPT

## 2024-05-28 PROCEDURE — 0502F SUBSEQUENT PRENATAL CARE: CPT

## 2024-05-28 PROCEDURE — 90715 TDAP VACCINE 7 YRS/> IM: CPT

## 2024-06-18 ENCOUNTER — APPOINTMENT (OUTPATIENT)
Dept: ANTEPARTUM | Facility: CLINIC | Age: 39
End: 2024-06-18
Payer: COMMERCIAL

## 2024-06-18 ENCOUNTER — ASOB RESULT (OUTPATIENT)
Age: 39
End: 2024-06-18

## 2024-06-18 ENCOUNTER — APPOINTMENT (OUTPATIENT)
Dept: OBGYN | Facility: CLINIC | Age: 39
End: 2024-06-18
Payer: COMMERCIAL

## 2024-06-18 DIAGNOSIS — R22.1 LOCALIZED SWELLING, MASS AND LUMP, NECK: ICD-10-CM

## 2024-06-18 PROCEDURE — 76816 OB US FOLLOW-UP PER FETUS: CPT

## 2024-06-18 PROCEDURE — 76819 FETAL BIOPHYS PROFIL W/O NST: CPT | Mod: 59

## 2024-06-18 PROCEDURE — 0502F SUBSEQUENT PRENATAL CARE: CPT

## 2024-06-21 LAB
GP B STREP DNA SPEC QL NAA+PROBE: DETECTED
HIV1+2 AB SPEC QL IA.RAPID: NONREACTIVE
SOURCE GBS: NORMAL
T PALLIDUM AB SER QL IA: NEGATIVE

## 2024-06-24 ENCOUNTER — NON-APPOINTMENT (OUTPATIENT)
Age: 39
End: 2024-06-24

## 2024-06-26 ENCOUNTER — OUTPATIENT (OUTPATIENT)
Dept: OUTPATIENT SERVICES | Facility: HOSPITAL | Age: 39
LOS: 1 days | End: 2024-06-26

## 2024-06-26 VITALS
DIASTOLIC BLOOD PRESSURE: 75 MMHG | HEART RATE: 76 BPM | RESPIRATION RATE: 16 BRPM | HEIGHT: 62 IN | WEIGHT: 203.93 LBS | TEMPERATURE: 98 F | OXYGEN SATURATION: 97 % | SYSTOLIC BLOOD PRESSURE: 116 MMHG

## 2024-06-26 DIAGNOSIS — Z98.890 OTHER SPECIFIED POSTPROCEDURAL STATES: Chronic | ICD-10-CM

## 2024-06-26 DIAGNOSIS — O34.29 MATERNAL CARE DUE TO UTERINE SCAR FROM OTHER PREVIOUS SURGERY: ICD-10-CM

## 2024-06-26 LAB
APPEARANCE UR: CLEAR — SIGNIFICANT CHANGE UP
BILIRUB UR-MCNC: NEGATIVE — SIGNIFICANT CHANGE UP
BLD GP AB SCN SERPL QL: NEGATIVE — SIGNIFICANT CHANGE UP
COLOR SPEC: YELLOW — SIGNIFICANT CHANGE UP
DIFF PNL FLD: NEGATIVE — SIGNIFICANT CHANGE UP
GLUCOSE UR QL: NEGATIVE MG/DL — SIGNIFICANT CHANGE UP
HCT VFR BLD CALC: 31.4 % — LOW (ref 34.5–45)
HGB BLD-MCNC: 10.3 G/DL — LOW (ref 11.5–15.5)
KETONES UR-MCNC: NEGATIVE MG/DL — SIGNIFICANT CHANGE UP
LEUKOCYTE ESTERASE UR-ACNC: NEGATIVE — SIGNIFICANT CHANGE UP
MCHC RBC-ENTMCNC: 28.1 PG — SIGNIFICANT CHANGE UP (ref 27–34)
MCHC RBC-ENTMCNC: 32.8 GM/DL — SIGNIFICANT CHANGE UP (ref 32–36)
MCV RBC AUTO: 85.6 FL — SIGNIFICANT CHANGE UP (ref 80–100)
NITRITE UR-MCNC: NEGATIVE — SIGNIFICANT CHANGE UP
NRBC # BLD: 1 /100 WBCS — HIGH (ref 0–0)
NRBC # FLD: 0.06 K/UL — HIGH (ref 0–0)
PH UR: 6 — SIGNIFICANT CHANGE UP (ref 5–8)
PLATELET # BLD AUTO: 157 K/UL — SIGNIFICANT CHANGE UP (ref 150–400)
PROT UR-MCNC: NEGATIVE MG/DL — SIGNIFICANT CHANGE UP
RBC # BLD: 3.67 M/UL — LOW (ref 3.8–5.2)
RBC # FLD: 14.9 % — HIGH (ref 10.3–14.5)
RH IG SCN BLD-IMP: POSITIVE — SIGNIFICANT CHANGE UP
SP GR SPEC: 1.02 — SIGNIFICANT CHANGE UP (ref 1–1.03)
UROBILINOGEN FLD QL: 0.2 MG/DL — SIGNIFICANT CHANGE UP (ref 0.2–1)
WBC # BLD: 5.89 K/UL — SIGNIFICANT CHANGE UP (ref 3.8–10.5)
WBC # FLD AUTO: 5.89 K/UL — SIGNIFICANT CHANGE UP (ref 3.8–10.5)

## 2024-06-28 ENCOUNTER — NON-APPOINTMENT (OUTPATIENT)
Age: 39
End: 2024-06-28

## 2024-06-28 ENCOUNTER — APPOINTMENT (OUTPATIENT)
Dept: OBGYN | Facility: CLINIC | Age: 39
End: 2024-06-28
Payer: COMMERCIAL

## 2024-06-28 PROCEDURE — 0502F SUBSEQUENT PRENATAL CARE: CPT

## 2024-07-02 ENCOUNTER — TRANSCRIPTION ENCOUNTER (OUTPATIENT)
Age: 39
End: 2024-07-02

## 2024-07-03 ENCOUNTER — APPOINTMENT (OUTPATIENT)
Dept: OBGYN | Facility: HOSPITAL | Age: 39
End: 2024-07-03

## 2024-07-03 ENCOUNTER — TRANSCRIPTION ENCOUNTER (OUTPATIENT)
Age: 39
End: 2024-07-03

## 2024-07-03 ENCOUNTER — INPATIENT (INPATIENT)
Facility: HOSPITAL | Age: 39
LOS: 2 days | Discharge: HOME | End: 2024-07-06
Attending: OBSTETRICS & GYNECOLOGY | Admitting: OBSTETRICS & GYNECOLOGY
Payer: COMMERCIAL

## 2024-07-03 VITALS — HEART RATE: 67 BPM | DIASTOLIC BLOOD PRESSURE: 69 MMHG | SYSTOLIC BLOOD PRESSURE: 129 MMHG

## 2024-07-03 DIAGNOSIS — Z98.890 OTHER SPECIFIED POSTPROCEDURAL STATES: Chronic | ICD-10-CM

## 2024-07-03 DIAGNOSIS — Z98.890 OTHER SPECIFIED POSTPROCEDURAL STATES: ICD-10-CM

## 2024-07-03 PROBLEM — O34.29 MATERNAL CARE DUE TO UTERINE SCAR FROM OTHER PREVIOUS SURGERY: Chronic | Status: ACTIVE | Noted: 2024-06-26

## 2024-07-03 LAB
BASOPHILS # BLD AUTO: 0.01 K/UL — SIGNIFICANT CHANGE UP (ref 0–0.2)
BASOPHILS NFR BLD AUTO: 0.1 % — SIGNIFICANT CHANGE UP (ref 0–2)
BLD GP AB SCN SERPL QL: NEGATIVE — SIGNIFICANT CHANGE UP
EOSINOPHIL # BLD AUTO: 0.05 K/UL — SIGNIFICANT CHANGE UP (ref 0–0.5)
EOSINOPHIL NFR BLD AUTO: 0.7 % — SIGNIFICANT CHANGE UP (ref 0–6)
HCT VFR BLD CALC: 35.4 % — SIGNIFICANT CHANGE UP (ref 34.5–45)
HGB BLD-MCNC: 11.1 G/DL — LOW (ref 11.5–15.5)
IANC: 5.01 K/UL — SIGNIFICANT CHANGE UP (ref 1.8–7.4)
IMM GRANULOCYTES NFR BLD AUTO: 0.8 % — SIGNIFICANT CHANGE UP (ref 0–0.9)
LYMPHOCYTES # BLD AUTO: 1.56 K/UL — SIGNIFICANT CHANGE UP (ref 1–3.3)
LYMPHOCYTES # BLD AUTO: 21.4 % — SIGNIFICANT CHANGE UP (ref 13–44)
MCHC RBC-ENTMCNC: 27.4 PG — SIGNIFICANT CHANGE UP (ref 27–34)
MCHC RBC-ENTMCNC: 31.4 GM/DL — LOW (ref 32–36)
MCV RBC AUTO: 87.4 FL — SIGNIFICANT CHANGE UP (ref 80–100)
MONOCYTES # BLD AUTO: 0.61 K/UL — SIGNIFICANT CHANGE UP (ref 0–0.9)
MONOCYTES NFR BLD AUTO: 8.4 % — SIGNIFICANT CHANGE UP (ref 2–14)
NEUTROPHILS # BLD AUTO: 5.01 K/UL — SIGNIFICANT CHANGE UP (ref 1.8–7.4)
NEUTROPHILS NFR BLD AUTO: 68.6 % — SIGNIFICANT CHANGE UP (ref 43–77)
NRBC # BLD AUTO: 0.07 K/UL — HIGH (ref 0–0)
NRBC # BLD: 1 /100 WBCS — HIGH (ref 0–0)
NRBC # FLD: 0.07 K/UL — HIGH (ref 0–0)
NRBC BLD-RTO: 1 /100 WBCS — HIGH (ref 0–0)
PLATELET # BLD AUTO: 170 K/UL — SIGNIFICANT CHANGE UP (ref 150–400)
RBC # BLD: 4.05 M/UL — SIGNIFICANT CHANGE UP (ref 3.8–5.2)
RBC # FLD: 15.2 % — HIGH (ref 10.3–14.5)
RH IG SCN BLD-IMP: POSITIVE — SIGNIFICANT CHANGE UP
WBC # BLD: 7.3 K/UL — SIGNIFICANT CHANGE UP (ref 3.8–10.5)
WBC # FLD AUTO: 7.3 K/UL — SIGNIFICANT CHANGE UP (ref 3.8–10.5)

## 2024-07-03 PROCEDURE — 59514 CESAREAN DELIVERY ONLY: CPT | Mod: AS,U8

## 2024-07-03 PROCEDURE — 59510 CESAREAN DELIVERY: CPT

## 2024-07-03 PROCEDURE — 59025 FETAL NON-STRESS TEST: CPT | Mod: 26

## 2024-07-03 RX ORDER — OXYCODONE HYDROCHLORIDE 30 MG/1
5 TABLET ORAL
Refills: 0 | Status: COMPLETED | OUTPATIENT
Start: 2024-07-03 | End: 2024-07-10

## 2024-07-03 RX ORDER — OXYCODONE HYDROCHLORIDE 30 MG/1
5 TABLET ORAL
Refills: 0 | Status: DISCONTINUED | OUTPATIENT
Start: 2024-07-03 | End: 2024-07-03

## 2024-07-03 RX ORDER — OXYTOCIN-SODIUM CHLORIDE 0.9% IV SOLN 30 UNIT/500ML 30-0.9/5 UT/ML-%
333.33 SOLUTION INTRAVENOUS
Qty: 20 | Refills: 0 | Status: COMPLETED | OUTPATIENT
Start: 2024-07-03 | End: 2024-07-03

## 2024-07-03 RX ORDER — SODIUM CHLORIDE 9 G/1000ML
500 INJECTION, SOLUTION INTRAVENOUS ONCE
Refills: 0 | Status: DISCONTINUED | OUTPATIENT
Start: 2024-07-03 | End: 2024-07-04

## 2024-07-03 RX ORDER — KETOROLAC TROMETHAMINE 30 MG/ML
30 INJECTION, SOLUTION INTRAMUSCULAR; INTRAVENOUS EVERY 6 HOURS
Refills: 0 | Status: DISCONTINUED | OUTPATIENT
Start: 2024-07-03 | End: 2024-07-04

## 2024-07-03 RX ORDER — CITRIC ACID/SODIUM CITRATE 300-500 MG
30 SOLUTION, ORAL ORAL ONCE
Refills: 0 | Status: COMPLETED | OUTPATIENT
Start: 2024-07-03 | End: 2024-07-03

## 2024-07-03 RX ORDER — HEPARIN SODIUM 1000 [USP'U]/ML
5000 INJECTION INTRAVENOUS; SUBCUTANEOUS EVERY 12 HOURS
Refills: 0 | Status: DISCONTINUED | OUTPATIENT
Start: 2024-07-03 | End: 2024-07-06

## 2024-07-03 RX ORDER — SIMETHICONE 80 MG
80 TABLET,CHEWABLE ORAL EVERY 4 HOURS
Refills: 0 | Status: DISCONTINUED | OUTPATIENT
Start: 2024-07-03 | End: 2024-07-06

## 2024-07-03 RX ORDER — DIPHENHYDRAMINE HCL 12.5MG/5ML
25 ELIXIR ORAL EVERY 6 HOURS
Refills: 0 | Status: DISCONTINUED | OUTPATIENT
Start: 2024-07-03 | End: 2024-07-06

## 2024-07-03 RX ORDER — MODIFIED LANOLIN 100 %
1 CREAM (GRAM) TOPICAL EVERY 6 HOURS
Refills: 0 | Status: DISCONTINUED | OUTPATIENT
Start: 2024-07-03 | End: 2024-07-06

## 2024-07-03 RX ORDER — DIPHENHYDRAMINE HCL 12.5MG/5ML
25 ELIXIR ORAL EVERY 4 HOURS
Refills: 0 | Status: DISCONTINUED | OUTPATIENT
Start: 2024-07-03 | End: 2024-07-05

## 2024-07-03 RX ORDER — OXYTOCIN-SODIUM CHLORIDE 0.9% IV SOLN 30 UNIT/500ML 30-0.9/5 UT/ML-%
333.33 SOLUTION INTRAVENOUS
Qty: 20 | Refills: 0 | Status: DISCONTINUED | OUTPATIENT
Start: 2024-07-03 | End: 2024-07-05

## 2024-07-03 RX ORDER — SODIUM CHLORIDE 9 G/1000ML
1000 INJECTION, SOLUTION INTRAVENOUS
Refills: 0 | Status: DISCONTINUED | OUTPATIENT
Start: 2024-07-03 | End: 2024-07-06

## 2024-07-03 RX ORDER — NALBUPHINE HYDROCHLORIDE 10 MG/ML
2.5 INJECTION INTRAMUSCULAR; INTRAVENOUS; SUBCUTANEOUS EVERY 6 HOURS
Refills: 0 | Status: DISCONTINUED | OUTPATIENT
Start: 2024-07-03 | End: 2024-07-05

## 2024-07-03 RX ORDER — SODIUM CHLORIDE 9 G/1000ML
1000 INJECTION, SOLUTION INTRAVENOUS ONCE
Refills: 0 | Status: DISCONTINUED | OUTPATIENT
Start: 2024-07-03 | End: 2024-07-04

## 2024-07-03 RX ORDER — IBUPROFEN 200 MG
600 TABLET ORAL EVERY 6 HOURS
Refills: 0 | Status: COMPLETED | OUTPATIENT
Start: 2024-07-03 | End: 2025-06-01

## 2024-07-03 RX ORDER — MAGNESIUM HYDROXIDE 400 MG/5ML
30 SUSPENSION ORAL
Refills: 0 | Status: DISCONTINUED | OUTPATIENT
Start: 2024-07-03 | End: 2024-07-06

## 2024-07-03 RX ORDER — DEXAMETHASONE 0.5 MG/1
4 TABLET ORAL EVERY 6 HOURS
Refills: 0 | Status: DISCONTINUED | OUTPATIENT
Start: 2024-07-03 | End: 2024-07-05

## 2024-07-03 RX ORDER — ACETAMINOPHEN 500 MG/5ML
975 LIQUID (ML) ORAL
Refills: 0 | Status: DISCONTINUED | OUTPATIENT
Start: 2024-07-03 | End: 2024-07-06

## 2024-07-03 RX ORDER — OXYCODONE HYDROCHLORIDE 30 MG/1
5 TABLET ORAL ONCE
Refills: 0 | Status: DISCONTINUED | OUTPATIENT
Start: 2024-07-03 | End: 2024-07-06

## 2024-07-03 RX ORDER — SODIUM CHLORIDE 9 G/1000ML
1000 INJECTION, SOLUTION INTRAVENOUS
Refills: 0 | Status: DISCONTINUED | OUTPATIENT
Start: 2024-07-03 | End: 2024-07-05

## 2024-07-03 RX ORDER — NALOXONE HYDROCHLORIDE 0.4 MG/ML
0.1 INJECTION, SOLUTION INTRAMUSCULAR; INTRAVENOUS; SUBCUTANEOUS
Refills: 0 | Status: DISCONTINUED | OUTPATIENT
Start: 2024-07-03 | End: 2024-07-05

## 2024-07-03 RX ORDER — SODIUM CHLORIDE 9 G/1000ML
1000 INJECTION, SOLUTION INTRAVENOUS
Refills: 0 | Status: DISCONTINUED | OUTPATIENT
Start: 2024-07-03 | End: 2024-07-04

## 2024-07-03 RX ORDER — ONDANSETRON HCL/PF 4 MG/2 ML
4 VIAL (ML) INJECTION EVERY 6 HOURS
Refills: 0 | Status: DISCONTINUED | OUTPATIENT
Start: 2024-07-03 | End: 2024-07-05

## 2024-07-03 RX ADMIN — Medication 25 MILLIGRAM(S): at 17:47

## 2024-07-03 RX ADMIN — Medication 30 MILLILITER(S): at 12:19

## 2024-07-03 RX ADMIN — HEPARIN SODIUM 5000 UNIT(S): 1000 INJECTION INTRAVENOUS; SUBCUTANEOUS at 20:21

## 2024-07-03 RX ADMIN — KETOROLAC TROMETHAMINE 30 MILLIGRAM(S): 30 INJECTION, SOLUTION INTRAMUSCULAR; INTRAVENOUS at 20:40

## 2024-07-03 RX ADMIN — OXYTOCIN-SODIUM CHLORIDE 0.9% IV SOLN 30 UNIT/500ML 1000 MILLIUNIT(S)/MIN: 30-0.9/5 SOLUTION at 17:52

## 2024-07-03 RX ADMIN — KETOROLAC TROMETHAMINE 30 MILLIGRAM(S): 30 INJECTION, SOLUTION INTRAMUSCULAR; INTRAVENOUS at 20:21

## 2024-07-03 RX ADMIN — Medication 20 MILLIGRAM(S): at 12:19

## 2024-07-03 RX ADMIN — Medication 1 APPLICATION(S): at 12:19

## 2024-07-03 RX ADMIN — SODIUM CHLORIDE 75 MILLILITER(S): 9 INJECTION, SOLUTION INTRAVENOUS at 17:52

## 2024-07-03 RX ADMIN — Medication 975 MILLIGRAM(S): at 23:44

## 2024-07-03 RX ADMIN — Medication 25 MILLIGRAM(S): at 22:37

## 2024-07-04 LAB
BASOPHILS # BLD AUTO: 0.02 K/UL — SIGNIFICANT CHANGE UP (ref 0–0.2)
BASOPHILS NFR BLD AUTO: 0.2 % — SIGNIFICANT CHANGE UP (ref 0–2)
EOSINOPHIL # BLD AUTO: 0 K/UL — SIGNIFICANT CHANGE UP (ref 0–0.5)
EOSINOPHIL NFR BLD AUTO: 0 % — SIGNIFICANT CHANGE UP (ref 0–6)
HCT VFR BLD CALC: 29.5 % — LOW (ref 34.5–45)
HGB BLD-MCNC: 9.5 G/DL — LOW (ref 11.5–15.5)
IANC: 10.12 K/UL — HIGH (ref 1.8–7.4)
IMM GRANULOCYTES NFR BLD AUTO: 0.5 % — SIGNIFICANT CHANGE UP (ref 0–0.9)
LYMPHOCYTES # BLD AUTO: 1.46 K/UL — SIGNIFICANT CHANGE UP (ref 1–3.3)
LYMPHOCYTES # BLD AUTO: 11.4 % — LOW (ref 13–44)
MCHC RBC-ENTMCNC: 28.1 PG — SIGNIFICANT CHANGE UP (ref 27–34)
MCHC RBC-ENTMCNC: 32.2 GM/DL — SIGNIFICANT CHANGE UP (ref 32–36)
MCV RBC AUTO: 87.3 FL — SIGNIFICANT CHANGE UP (ref 80–100)
MONOCYTES # BLD AUTO: 1.14 K/UL — HIGH (ref 0–0.9)
MONOCYTES NFR BLD AUTO: 8.9 % — SIGNIFICANT CHANGE UP (ref 2–14)
NEUTROPHILS # BLD AUTO: 10.12 K/UL — HIGH (ref 1.8–7.4)
NEUTROPHILS NFR BLD AUTO: 79 % — HIGH (ref 43–77)
NRBC # BLD AUTO: 0.05 K/UL — HIGH (ref 0–0)
NRBC # BLD: 0 /100 WBCS — SIGNIFICANT CHANGE UP (ref 0–0)
NRBC # FLD: 0.05 K/UL — HIGH (ref 0–0)
NRBC BLD-RTO: 0 /100 WBCS — SIGNIFICANT CHANGE UP (ref 0–0)
PLATELET # BLD AUTO: 162 K/UL — SIGNIFICANT CHANGE UP (ref 150–400)
RBC # BLD: 3.38 M/UL — LOW (ref 3.8–5.2)
RBC # FLD: 15 % — HIGH (ref 10.3–14.5)
T PALLIDUM AB TITR SER: NEGATIVE — SIGNIFICANT CHANGE UP
WBC # BLD: 12.8 K/UL — HIGH (ref 3.8–10.5)
WBC # FLD AUTO: 12.8 K/UL — HIGH (ref 3.8–10.5)

## 2024-07-04 RX ORDER — IBUPROFEN 200 MG
600 TABLET ORAL EVERY 6 HOURS
Refills: 0 | Status: DISCONTINUED | OUTPATIENT
Start: 2024-07-04 | End: 2024-07-06

## 2024-07-04 RX ORDER — SENNA 187 MG
1 TABLET ORAL ONCE
Refills: 0 | Status: COMPLETED | OUTPATIENT
Start: 2024-07-04 | End: 2024-07-04

## 2024-07-04 RX ADMIN — KETOROLAC TROMETHAMINE 30 MILLIGRAM(S): 30 INJECTION, SOLUTION INTRAMUSCULAR; INTRAVENOUS at 09:36

## 2024-07-04 RX ADMIN — KETOROLAC TROMETHAMINE 30 MILLIGRAM(S): 30 INJECTION, SOLUTION INTRAMUSCULAR; INTRAVENOUS at 08:55

## 2024-07-04 RX ADMIN — Medication 975 MILLIGRAM(S): at 17:05

## 2024-07-04 RX ADMIN — HEPARIN SODIUM 5000 UNIT(S): 1000 INJECTION INTRAVENOUS; SUBCUTANEOUS at 08:55

## 2024-07-04 RX ADMIN — HEPARIN SODIUM 5000 UNIT(S): 1000 INJECTION INTRAVENOUS; SUBCUTANEOUS at 20:44

## 2024-07-04 RX ADMIN — Medication 600 MILLIGRAM(S): at 21:30

## 2024-07-04 RX ADMIN — Medication 600 MILLIGRAM(S): at 16:25

## 2024-07-04 RX ADMIN — Medication 975 MILLIGRAM(S): at 05:45

## 2024-07-04 RX ADMIN — Medication 1 TABLET(S): at 05:45

## 2024-07-04 RX ADMIN — KETOROLAC TROMETHAMINE 30 MILLIGRAM(S): 30 INJECTION, SOLUTION INTRAMUSCULAR; INTRAVENOUS at 02:32

## 2024-07-04 RX ADMIN — Medication 975 MILLIGRAM(S): at 06:30

## 2024-07-04 RX ADMIN — Medication 975 MILLIGRAM(S): at 13:00

## 2024-07-04 RX ADMIN — Medication 975 MILLIGRAM(S): at 17:57

## 2024-07-04 RX ADMIN — Medication 600 MILLIGRAM(S): at 15:28

## 2024-07-04 RX ADMIN — Medication 975 MILLIGRAM(S): at 23:44

## 2024-07-04 RX ADMIN — Medication 975 MILLIGRAM(S): at 00:30

## 2024-07-04 RX ADMIN — Medication 975 MILLIGRAM(S): at 12:14

## 2024-07-04 RX ADMIN — KETOROLAC TROMETHAMINE 30 MILLIGRAM(S): 30 INJECTION, SOLUTION INTRAMUSCULAR; INTRAVENOUS at 02:50

## 2024-07-04 RX ADMIN — Medication 600 MILLIGRAM(S): at 20:44

## 2024-07-05 RX ORDER — IBUPROFEN 200 MG
1 TABLET ORAL
Qty: 0 | Refills: 0 | DISCHARGE
Start: 2024-07-05

## 2024-07-05 RX ORDER — PRENATAL VIT/IRON FUM/FOLIC AC 60 MG-1 MG
0 TABLET ORAL
Refills: 0 | DISCHARGE

## 2024-07-05 RX ORDER — SENNA 187 MG
2 TABLET ORAL AT BEDTIME
Refills: 0 | Status: DISCONTINUED | OUTPATIENT
Start: 2024-07-05 | End: 2024-07-06

## 2024-07-05 RX ORDER — ACETAMINOPHEN 500 MG/5ML
3 LIQUID (ML) ORAL
Qty: 0 | Refills: 0 | DISCHARGE
Start: 2024-07-05

## 2024-07-05 RX ADMIN — Medication 975 MILLIGRAM(S): at 23:52

## 2024-07-05 RX ADMIN — HEPARIN SODIUM 5000 UNIT(S): 1000 INJECTION INTRAVENOUS; SUBCUTANEOUS at 20:23

## 2024-07-05 RX ADMIN — Medication 600 MILLIGRAM(S): at 16:16

## 2024-07-05 RX ADMIN — Medication 975 MILLIGRAM(S): at 18:46

## 2024-07-05 RX ADMIN — Medication 2 TABLET(S): at 20:26

## 2024-07-05 RX ADMIN — Medication 975 MILLIGRAM(S): at 00:30

## 2024-07-05 RX ADMIN — Medication 600 MILLIGRAM(S): at 02:37

## 2024-07-05 RX ADMIN — Medication 600 MILLIGRAM(S): at 00:00

## 2024-07-05 RX ADMIN — Medication 975 MILLIGRAM(S): at 06:20

## 2024-07-05 RX ADMIN — Medication 600 MILLIGRAM(S): at 17:00

## 2024-07-05 RX ADMIN — Medication 600 MILLIGRAM(S): at 08:40

## 2024-07-05 RX ADMIN — Medication 975 MILLIGRAM(S): at 18:00

## 2024-07-05 RX ADMIN — Medication 975 MILLIGRAM(S): at 05:34

## 2024-07-05 RX ADMIN — Medication 600 MILLIGRAM(S): at 09:40

## 2024-07-05 RX ADMIN — HEPARIN SODIUM 5000 UNIT(S): 1000 INJECTION INTRAVENOUS; SUBCUTANEOUS at 08:41

## 2024-07-05 RX ADMIN — Medication 600 MILLIGRAM(S): at 21:00

## 2024-07-05 RX ADMIN — Medication 600 MILLIGRAM(S): at 20:27

## 2024-07-05 RX ADMIN — Medication 975 MILLIGRAM(S): at 12:22

## 2024-07-05 RX ADMIN — Medication 975 MILLIGRAM(S): at 13:00

## 2024-07-05 RX ADMIN — Medication 600 MILLIGRAM(S): at 03:15

## 2024-07-06 VITALS
RESPIRATION RATE: 18 BRPM | SYSTOLIC BLOOD PRESSURE: 125 MMHG | TEMPERATURE: 99 F | OXYGEN SATURATION: 99 % | HEART RATE: 72 BPM | DIASTOLIC BLOOD PRESSURE: 70 MMHG

## 2024-07-06 RX ORDER — OXYCODONE HYDROCHLORIDE 30 MG/1
5 TABLET ORAL
Refills: 0 | Status: DISCONTINUED | OUTPATIENT
Start: 2024-07-06 | End: 2024-07-06

## 2024-07-06 RX ORDER — FERROUS SULFATE 137(45) MG
325 TABLET, EXTENDED RELEASE ORAL
Refills: 0 | Status: DISCONTINUED | OUTPATIENT
Start: 2024-07-06 | End: 2024-07-06

## 2024-07-06 RX ADMIN — Medication 975 MILLIGRAM(S): at 06:50

## 2024-07-06 RX ADMIN — HEPARIN SODIUM 5000 UNIT(S): 1000 INJECTION INTRAVENOUS; SUBCUTANEOUS at 08:14

## 2024-07-06 RX ADMIN — Medication 80 MILLIGRAM(S): at 08:14

## 2024-07-06 RX ADMIN — Medication 975 MILLIGRAM(S): at 06:16

## 2024-07-06 RX ADMIN — Medication 600 MILLIGRAM(S): at 04:00

## 2024-07-06 RX ADMIN — Medication 600 MILLIGRAM(S): at 08:23

## 2024-07-06 RX ADMIN — Medication 600 MILLIGRAM(S): at 14:39

## 2024-07-06 RX ADMIN — OXYCODONE HYDROCHLORIDE 5 MILLIGRAM(S): 30 TABLET ORAL at 14:39

## 2024-07-06 RX ADMIN — Medication 975 MILLIGRAM(S): at 11:30

## 2024-07-06 RX ADMIN — Medication 600 MILLIGRAM(S): at 09:18

## 2024-07-06 RX ADMIN — Medication 600 MILLIGRAM(S): at 15:11

## 2024-07-06 RX ADMIN — Medication 600 MILLIGRAM(S): at 03:23

## 2024-07-06 RX ADMIN — Medication 975 MILLIGRAM(S): at 00:25

## 2024-07-06 RX ADMIN — Medication 975 MILLIGRAM(S): at 12:17

## 2024-07-18 ENCOUNTER — APPOINTMENT (OUTPATIENT)
Dept: OBGYN | Facility: CLINIC | Age: 39
End: 2024-07-18
Payer: COMMERCIAL

## 2024-07-18 VITALS
WEIGHT: 185 LBS | SYSTOLIC BLOOD PRESSURE: 125 MMHG | BODY MASS INDEX: 32.78 KG/M2 | DIASTOLIC BLOOD PRESSURE: 77 MMHG | HEIGHT: 63 IN

## 2024-07-18 PROCEDURE — 0503F POSTPARTUM CARE VISIT: CPT

## 2024-07-22 DIAGNOSIS — R22.1 LOCALIZED SWELLING, MASS AND LUMP, NECK: ICD-10-CM

## 2024-08-10 ENCOUNTER — OUTPATIENT (OUTPATIENT)
Dept: OUTPATIENT SERVICES | Facility: HOSPITAL | Age: 39
LOS: 1 days | End: 2024-08-10
Payer: COMMERCIAL

## 2024-08-10 ENCOUNTER — APPOINTMENT (OUTPATIENT)
Dept: MRI IMAGING | Facility: CLINIC | Age: 39
End: 2024-08-10

## 2024-08-10 DIAGNOSIS — R22.1 LOCALIZED SWELLING, MASS AND LUMP, NECK: ICD-10-CM

## 2024-08-10 DIAGNOSIS — Z00.8 ENCOUNTER FOR OTHER GENERAL EXAMINATION: ICD-10-CM

## 2024-08-10 DIAGNOSIS — Z98.890 OTHER SPECIFIED POSTPROCEDURAL STATES: Chronic | ICD-10-CM

## 2024-08-10 PROCEDURE — 70540 MRI ORBIT/FACE/NECK W/O DYE: CPT | Mod: 26

## 2024-08-16 ENCOUNTER — APPOINTMENT (OUTPATIENT)
Dept: OBGYN | Facility: CLINIC | Age: 39
End: 2024-08-16
Payer: COMMERCIAL

## 2024-08-16 VITALS
SYSTOLIC BLOOD PRESSURE: 133 MMHG | WEIGHT: 185 LBS | BODY MASS INDEX: 32.78 KG/M2 | HEIGHT: 63 IN | DIASTOLIC BLOOD PRESSURE: 82 MMHG

## 2024-08-16 DIAGNOSIS — K64.9 UNSPECIFIED HEMORRHOIDS: ICD-10-CM

## 2024-08-16 PROCEDURE — 0503F POSTPARTUM CARE VISIT: CPT

## 2024-08-16 RX ORDER — HYDROCORTISONE 25 MG/G
2.5 CREAM TOPICAL
Qty: 2 | Refills: 1 | Status: ACTIVE | COMMUNITY
Start: 2024-08-16 | End: 1900-01-01

## 2024-08-20 PROBLEM — K64.9 HEMORRHOID: Status: ACTIVE | Noted: 2024-08-16

## 2024-08-20 NOTE — PHYSICAL EXAM
[Soft] : soft [Non-tender] : non-tender [Non-distended] : non-distended [FreeTextEntry7] : Incision C/D/I

## 2024-08-20 NOTE — PLAN
[FreeTextEntry1] : pt still considering Mirena IUD  had window at time of surgery so aware that furture delivery must occur prior to 37 wks f/u MRI for neck swelling of unknow etiology  continue routine postpartum/postop care  anusol Rx sent for painful hemorrhoids  f/u for annual or prn

## 2024-08-20 NOTE — HISTORY OF PRESENT ILLNESS
[TextBox_4] : pt presents for postop visit #2  s/p uncomplicated RCS recovering appropriately  c/o painful external hemorrhoids

## 2024-09-05 ENCOUNTER — APPOINTMENT (OUTPATIENT)
Dept: CT IMAGING | Facility: IMAGING CENTER | Age: 39
End: 2024-09-05

## 2024-09-05 ENCOUNTER — NON-APPOINTMENT (OUTPATIENT)
Age: 39
End: 2024-09-05

## 2024-09-05 PROCEDURE — 70540 MRI ORBIT/FACE/NECK W/O DYE: CPT

## 2024-09-05 PROCEDURE — 70490 CT SOFT TISSUE NECK W/O DYE: CPT

## 2024-09-05 PROCEDURE — 70490 CT SOFT TISSUE NECK W/O DYE: CPT | Mod: 26

## 2024-09-17 ENCOUNTER — APPOINTMENT (OUTPATIENT)
Dept: OBGYN | Facility: CLINIC | Age: 39
End: 2024-09-17
Payer: COMMERCIAL

## 2024-09-17 VITALS — DIASTOLIC BLOOD PRESSURE: 75 MMHG | HEIGHT: 63 IN | SYSTOLIC BLOOD PRESSURE: 112 MMHG

## 2024-09-17 DIAGNOSIS — N61.0 MASTITIS WITHOUT ABSCESS: ICD-10-CM

## 2024-09-17 PROCEDURE — 99212 OFFICE O/P EST SF 10 MIN: CPT

## 2024-09-17 RX ORDER — DICLOXACILLIN SODIUM 500 MG/1
500 CAPSULE ORAL 4 TIMES DAILY
Qty: 40 | Refills: 0 | Status: ACTIVE | COMMUNITY
Start: 2024-09-17 | End: 1900-01-01

## 2024-09-19 NOTE — HISTORY OF PRESENT ILLNESS
[TextBox_4] : pt presents for eval of right breast pain and flu-like symptoms  pt was concerned with mastitis  denies overt fever  tried pumping to release palpable clogged ducts in right breast

## 2024-09-19 NOTE — PLAN
[FreeTextEntry1] : precautions to report to ED reviewed  warm compresses and gentle traction along painful area while pumping to try to release entrapped milk  diclox rx sent in interim,  can stop after 7 days of use if resolved  counseled on risk of infant diarrhea and thrush with use

## 2024-09-19 NOTE — PHYSICAL EXAM
[FreeTextEntry6] : cystic breast bilaterally, no palpable masses concerning for abscess  [Breast Palpation Diffuse Fibrous Tissue Bilateral] : fibrocystic changes
